# Patient Record
Sex: FEMALE | Race: WHITE | NOT HISPANIC OR LATINO | Employment: FULL TIME | URBAN - NONMETROPOLITAN AREA
[De-identification: names, ages, dates, MRNs, and addresses within clinical notes are randomized per-mention and may not be internally consistent; named-entity substitution may affect disease eponyms.]

---

## 2021-08-19 ENCOUNTER — LAB (OUTPATIENT)
Dept: LAB | Facility: HOSPITAL | Age: 31
End: 2021-08-19

## 2021-08-19 ENCOUNTER — OFFICE VISIT (OUTPATIENT)
Dept: INTERNAL MEDICINE | Facility: CLINIC | Age: 31
End: 2021-08-19

## 2021-08-19 VITALS
HEIGHT: 64 IN | WEIGHT: 129.9 LBS | BODY MASS INDEX: 22.18 KG/M2 | OXYGEN SATURATION: 99 % | RESPIRATION RATE: 16 BRPM | SYSTOLIC BLOOD PRESSURE: 112 MMHG | TEMPERATURE: 98.2 F | HEART RATE: 77 BPM | DIASTOLIC BLOOD PRESSURE: 84 MMHG

## 2021-08-19 DIAGNOSIS — E03.9 HYPOTHYROIDISM, UNSPECIFIED TYPE: Primary | ICD-10-CM

## 2021-08-19 DIAGNOSIS — Z00.00 PREVENTATIVE HEALTH CARE: ICD-10-CM

## 2021-08-19 DIAGNOSIS — R53.83 FATIGUE, UNSPECIFIED TYPE: ICD-10-CM

## 2021-08-19 DIAGNOSIS — E78.01 FAMILIAL HYPERCHOLESTEREMIA: ICD-10-CM

## 2021-08-19 DIAGNOSIS — E03.9 HYPOTHYROIDISM, UNSPECIFIED TYPE: ICD-10-CM

## 2021-08-19 LAB
ALBUMIN SERPL-MCNC: 4.3 G/DL (ref 3.5–5)
ALBUMIN/GLOB SERPL: 1.3 G/DL (ref 1.1–2.5)
ALP SERPL-CCNC: 54 U/L (ref 24–120)
ALT SERPL W P-5'-P-CCNC: 13 U/L (ref 0–35)
ANION GAP SERPL CALCULATED.3IONS-SCNC: 5 MMOL/L (ref 4–13)
AST SERPL-CCNC: 22 U/L (ref 7–45)
BILIRUB SERPL-MCNC: 0.3 MG/DL (ref 0.1–1)
BUN SERPL-MCNC: 14 MG/DL (ref 5–21)
BUN/CREAT SERPL: 15.9
CALCIUM SPEC-SCNC: 9.2 MG/DL (ref 8.4–10.4)
CHLORIDE SERPL-SCNC: 109 MMOL/L (ref 98–110)
CHOLEST SERPL-MCNC: 172 MG/DL (ref 130–200)
CO2 SERPL-SCNC: 27 MMOL/L (ref 24–31)
CREAT SERPL-MCNC: 0.88 MG/DL (ref 0.5–1.4)
ERYTHROCYTE [DISTWIDTH] IN BLOOD BY AUTOMATED COUNT: 12 % (ref 12.3–15.4)
GFR SERPL CREATININE-BSD FRML MDRD: 77 ML/MIN/1.73
GLOBULIN UR ELPH-MCNC: 3.3 GM/DL
GLUCOSE SERPL-MCNC: 80 MG/DL (ref 70–100)
HCT VFR BLD AUTO: 40.6 % (ref 34–46.6)
HDLC SERPL-MCNC: 80 MG/DL
HGB BLD-MCNC: 13.3 G/DL (ref 12–15.9)
LDLC SERPL CALC-MCNC: 64 MG/DL (ref 0–99)
LDLC/HDLC SERPL: 0.72 {RATIO}
MCH RBC QN AUTO: 28.8 PG (ref 26.6–33)
MCHC RBC AUTO-ENTMCNC: 32.8 G/DL (ref 31.5–35.7)
MCV RBC AUTO: 87.9 FL (ref 79–97)
PLATELET # BLD AUTO: 339 10*3/MM3 (ref 140–450)
PMV BLD AUTO: 8.4 FL (ref 6–12)
POTASSIUM SERPL-SCNC: 4.3 MMOL/L (ref 3.5–5.3)
PROT SERPL-MCNC: 7.6 G/DL (ref 6.3–8.7)
RBC # BLD AUTO: 4.62 10*6/MM3 (ref 3.77–5.28)
SODIUM SERPL-SCNC: 141 MMOL/L (ref 135–145)
TRIGL SERPL-MCNC: 174 MG/DL (ref 0–149)
VLDLC SERPL-MCNC: 28 MG/DL (ref 5–40)
WBC # BLD AUTO: 6.9 10*3/MM3 (ref 3.4–10.8)

## 2021-08-19 PROCEDURE — 80053 COMPREHEN METABOLIC PANEL: CPT

## 2021-08-19 PROCEDURE — 80061 LIPID PANEL: CPT

## 2021-08-19 PROCEDURE — 84443 ASSAY THYROID STIM HORMONE: CPT

## 2021-08-19 PROCEDURE — 36415 COLL VENOUS BLD VENIPUNCTURE: CPT

## 2021-08-19 PROCEDURE — 86803 HEPATITIS C AB TEST: CPT

## 2021-08-19 PROCEDURE — 85027 COMPLETE CBC AUTOMATED: CPT

## 2021-08-19 PROCEDURE — 99203 OFFICE O/P NEW LOW 30 MIN: CPT | Performed by: NURSE PRACTITIONER

## 2021-08-19 RX ORDER — BUPROPION HYDROCHLORIDE 150 MG/1
150 TABLET ORAL DAILY
COMMUNITY
Start: 2021-07-16 | End: 2022-08-10

## 2021-08-19 RX ORDER — OMEPRAZOLE 20 MG/1
20 CAPSULE, DELAYED RELEASE ORAL DAILY
COMMUNITY
End: 2022-09-14 | Stop reason: SDUPTHER

## 2021-08-19 NOTE — PROGRESS NOTES
"Chief Complaint   Patient presents with   • Establish Care     requests labs to check thyroid   • Hypothyroidism       History:  Emerita Hutchins is a 30 y.o. female who presents today for follow-up for evaluation of the above:    HPI     Patient presents today to establish care and for follow-up of hypothyroidism.  She states she is currently not on treatment for hypothyroidism.  She has been experiencing hair loss and dry skin.  She reports that she did have Covid in February.        ROS:  Review of Systems   Constitutional: Positive for fatigue.   HENT:        Hair loss   Skin:        dryness   All other systems reviewed and are negative.      Ms. Hutchins  reports that she has never smoked. She has never used smokeless tobacco. She reports current alcohol use. She reports that she does not use drugs.      Current Outpatient Medications:   •  buPROPion XL (WELLBUTRIN XL) 150 MG 24 hr tablet, Take 150 mg by mouth Daily., Disp: , Rfl:   •  Norgestim-Eth Estrad Triphasic (ORTHO TRI-CYCLEN, 28, PO), Take 1 tablet by mouth Daily., Disp: , Rfl:   •  omeprazole (priLOSEC) 20 MG capsule, Take 20 mg by mouth Daily., Disp: , Rfl:       OBJECTIVE:  /84 (BP Location: Right arm, Patient Position: Sitting, Cuff Size: Adult)   Pulse 77   Temp 98.2 °F (36.8 °C) (Temporal)   Resp 16   Ht 162.6 cm (64\")   Wt 58.9 kg (129 lb 14.4 oz)   SpO2 99%   BMI 22.30 kg/m²    Physical Exam  Vitals reviewed.   Constitutional:       Appearance: She is well-developed.   HENT:      Head: Normocephalic and atraumatic.   Eyes:      Pupils: Pupils are equal, round, and reactive to light.   Cardiovascular:      Rate and Rhythm: Normal rate and regular rhythm.      Heart sounds: Normal heart sounds.   Pulmonary:      Effort: Pulmonary effort is normal.      Breath sounds: Normal breath sounds.   Abdominal:      General: Bowel sounds are normal.      Palpations: Abdomen is soft.   Musculoskeletal:         General: Normal range of motion.      " Cervical back: Normal range of motion and neck supple.   Skin:     General: Skin is warm and dry.   Neurological:      Mental Status: She is alert and oriented to person, place, and time.         Assessment/Plan    Diagnoses and all orders for this visit:    1. Hypothyroidism, unspecified type (Primary)  -     TSH; Future  Labs to further evaluate.    2. Fatigue, unspecified type  -     CBC No Differential; Future  -     Comprehensive metabolic panel; Future    3. Familial hypercholesteremia  -     Lipid panel; Future    4. Preventative health care  -     Hepatitis C antibody; Future         An After Visit Summary was printed and given to the patient at discharge.  Return in about 1 year (around 8/19/2022) for Annual physical. Sooner if problems arise.          Shelley Banerjee APRN. 8/19/2021   Electronically Signed

## 2021-08-20 LAB
HCV AB SER DONR QL: NORMAL
TSH SERPL DL<=0.05 MIU/L-ACNC: 2.38 UIU/ML (ref 0.27–4.2)

## 2021-09-23 RX ORDER — BUSPIRONE HYDROCHLORIDE 5 MG/1
5 TABLET ORAL 3 TIMES DAILY PRN
Qty: 60 TABLET | Refills: 6 | Status: SHIPPED | OUTPATIENT
Start: 2021-09-23 | End: 2023-02-03

## 2021-10-07 ENCOUNTER — IMMUNIZATION (OUTPATIENT)
Dept: PRIMARY CARE CLINIC | Age: 31
End: 2021-10-07
Payer: COMMERCIAL

## 2021-10-07 DIAGNOSIS — Z23 NEED FOR INFLUENZA VACCINATION: Primary | ICD-10-CM

## 2021-10-07 PROCEDURE — 90674 CCIIV4 VAC NO PRSV 0.5 ML IM: CPT | Performed by: NURSE PRACTITIONER

## 2021-10-07 PROCEDURE — 90471 IMMUNIZATION ADMIN: CPT | Performed by: NURSE PRACTITIONER

## 2021-10-22 DIAGNOSIS — B00.1 HERPES LABIALIS: Primary | ICD-10-CM

## 2021-10-22 RX ORDER — VALACYCLOVIR HYDROCHLORIDE 1 G/1
2000 TABLET, FILM COATED ORAL 2 TIMES DAILY
Qty: 4 TABLET | Refills: 0 | Status: SHIPPED | OUTPATIENT
Start: 2021-10-22 | End: 2021-10-23

## 2021-10-25 DIAGNOSIS — R05.9 COUGH: Primary | ICD-10-CM

## 2021-10-25 DIAGNOSIS — R51.9 NONINTRACTABLE HEADACHE, UNSPECIFIED CHRONICITY PATTERN, UNSPECIFIED HEADACHE TYPE: ICD-10-CM

## 2021-10-25 LAB
ADENOVIRUS BY PCR: NOT DETECTED
BORDETELLA PARAPERTUSSIS BY PCR: NOT DETECTED
BORDETELLA PERTUSSIS BY PCR: NOT DETECTED
CHLAMYDOPHILIA PNEUMONIAE BY PCR: NOT DETECTED
CORONAVIRUS 229E BY PCR: NOT DETECTED
CORONAVIRUS HKU1 BY PCR: NOT DETECTED
CORONAVIRUS NL63 BY PCR: NOT DETECTED
CORONAVIRUS OC43 BY PCR: NOT DETECTED
HUMAN METAPNEUMOVIRUS BY PCR: NOT DETECTED
HUMAN RHINOVIRUS/ENTEROVIRUS BY PCR: NOT DETECTED
INFLUENZA A BY PCR: NOT DETECTED
INFLUENZA B BY PCR: NOT DETECTED
MYCOPLASMA PNEUMONIAE BY PCR: NOT DETECTED
PARAINFLUENZA VIRUS 1 BY PCR: NOT DETECTED
PARAINFLUENZA VIRUS 2 BY PCR: NOT DETECTED
PARAINFLUENZA VIRUS 3 BY PCR: NOT DETECTED
PARAINFLUENZA VIRUS 4 BY PCR: NOT DETECTED
RESPIRATORY SYNCYTIAL VIRUS BY PCR: NOT DETECTED
SARS-COV-2, PCR: NOT DETECTED

## 2022-04-22 DIAGNOSIS — J01.10 ACUTE NON-RECURRENT FRONTAL SINUSITIS: Primary | ICD-10-CM

## 2022-04-22 RX ORDER — AMOXICILLIN AND CLAVULANATE POTASSIUM 875; 125 MG/1; MG/1
1 TABLET, FILM COATED ORAL 2 TIMES DAILY
Qty: 20 TABLET | Refills: 0 | Status: SHIPPED | OUTPATIENT
Start: 2022-04-22 | End: 2022-05-02

## 2022-04-22 RX ORDER — METHYLPREDNISOLONE 4 MG/1
TABLET ORAL
Qty: 21 EACH | Refills: 0 | Status: SHIPPED | OUTPATIENT
Start: 2022-04-22 | End: 2022-08-19

## 2022-08-19 ENCOUNTER — OFFICE VISIT (OUTPATIENT)
Dept: INTERNAL MEDICINE | Facility: CLINIC | Age: 32
End: 2022-08-19

## 2022-08-19 ENCOUNTER — LAB (OUTPATIENT)
Dept: LAB | Facility: HOSPITAL | Age: 32
End: 2022-08-19

## 2022-08-19 VITALS
OXYGEN SATURATION: 96 % | BODY MASS INDEX: 21.56 KG/M2 | TEMPERATURE: 98 F | SYSTOLIC BLOOD PRESSURE: 112 MMHG | RESPIRATION RATE: 16 BRPM | HEIGHT: 64 IN | WEIGHT: 126.3 LBS | DIASTOLIC BLOOD PRESSURE: 82 MMHG | HEART RATE: 72 BPM

## 2022-08-19 DIAGNOSIS — F32.A DEPRESSIVE DISORDER: ICD-10-CM

## 2022-08-19 DIAGNOSIS — Z80.0 FAMILY HISTORY OF ESOPHAGEAL CANCER: ICD-10-CM

## 2022-08-19 DIAGNOSIS — Z83.79 FAMILY HISTORY OF BARRETT'S ESOPHAGUS: ICD-10-CM

## 2022-08-19 DIAGNOSIS — Z86.39 HISTORY OF UNDERACTIVE THYROID: ICD-10-CM

## 2022-08-19 DIAGNOSIS — Z00.01 ANNUAL VISIT FOR GENERAL ADULT MEDICAL EXAMINATION WITH ABNORMAL FINDINGS: Primary | ICD-10-CM

## 2022-08-19 DIAGNOSIS — Z00.01 ANNUAL VISIT FOR GENERAL ADULT MEDICAL EXAMINATION WITH ABNORMAL FINDINGS: ICD-10-CM

## 2022-08-19 DIAGNOSIS — L65.9 HAIR LOSS: ICD-10-CM

## 2022-08-19 DIAGNOSIS — K21.9 GASTROESOPHAGEAL REFLUX DISEASE, UNSPECIFIED WHETHER ESOPHAGITIS PRESENT: ICD-10-CM

## 2022-08-19 PROBLEM — M62.838 MUSCLE SPASMS OF HEAD OR NECK: Status: ACTIVE | Noted: 2018-03-02

## 2022-08-19 PROBLEM — R07.0 PAIN IN THROAT: Status: RESOLVED | Noted: 2017-07-19 | Resolved: 2022-08-19

## 2022-08-19 PROBLEM — J03.90 ACUTE TONSILLITIS: Status: ACTIVE | Noted: 2017-07-19

## 2022-08-19 PROBLEM — E03.9 ACQUIRED HYPOTHYROIDISM: Status: RESOLVED | Noted: 2017-07-19 | Resolved: 2022-08-19

## 2022-08-19 PROBLEM — R07.0 PAIN IN THROAT: Status: ACTIVE | Noted: 2017-07-19

## 2022-08-19 PROBLEM — M54.2 NECK PAIN: Status: ACTIVE | Noted: 2018-03-02

## 2022-08-19 PROBLEM — E03.9 ACQUIRED HYPOTHYROIDISM: Status: ACTIVE | Noted: 2017-07-19

## 2022-08-19 PROBLEM — J03.90 ACUTE TONSILLITIS: Status: RESOLVED | Noted: 2017-07-19 | Resolved: 2022-08-19

## 2022-08-19 PROBLEM — M54.2 NECK PAIN: Status: RESOLVED | Noted: 2018-03-02 | Resolved: 2022-08-19

## 2022-08-19 LAB
ALBUMIN SERPL-MCNC: 4.9 G/DL (ref 3.5–5.2)
ALBUMIN/GLOB SERPL: 1.8 G/DL
ALP SERPL-CCNC: 57 U/L (ref 39–117)
ALT SERPL W P-5'-P-CCNC: 18 U/L (ref 1–33)
ANION GAP SERPL CALCULATED.3IONS-SCNC: 11 MMOL/L (ref 5–15)
AST SERPL-CCNC: 18 U/L (ref 1–32)
BILIRUB SERPL-MCNC: 0.5 MG/DL (ref 0–1.2)
BUN SERPL-MCNC: 18 MG/DL (ref 6–20)
BUN/CREAT SERPL: 19.6 (ref 7–25)
CALCIUM SPEC-SCNC: 9.3 MG/DL (ref 8.6–10.5)
CHLORIDE SERPL-SCNC: 105 MMOL/L (ref 98–107)
CHOLEST SERPL-MCNC: 171 MG/DL (ref 0–200)
CO2 SERPL-SCNC: 22 MMOL/L (ref 22–29)
CREAT SERPL-MCNC: 0.92 MG/DL (ref 0.57–1)
EGFRCR SERPLBLD CKD-EPI 2021: 86.6 ML/MIN/1.73
GLOBULIN UR ELPH-MCNC: 2.8 GM/DL
GLUCOSE SERPL-MCNC: 87 MG/DL (ref 65–99)
HBA1C MFR BLD: 4.9 % (ref 4.8–5.6)
HDLC SERPL-MCNC: 76 MG/DL (ref 40–60)
LDLC SERPL CALC-MCNC: 83 MG/DL (ref 0–100)
LDLC/HDLC SERPL: 1.09 {RATIO}
POTASSIUM SERPL-SCNC: 4 MMOL/L (ref 3.5–5.2)
PROT SERPL-MCNC: 7.7 G/DL (ref 6–8.5)
SODIUM SERPL-SCNC: 138 MMOL/L (ref 136–145)
T4 FREE SERPL-MCNC: 1.03 NG/DL (ref 0.93–1.7)
TRIGL SERPL-MCNC: 62 MG/DL (ref 0–150)
TSH SERPL DL<=0.05 MIU/L-ACNC: 2.04 UIU/ML (ref 0.27–4.2)
VLDLC SERPL-MCNC: 12 MG/DL (ref 5–40)

## 2022-08-19 PROCEDURE — 83036 HEMOGLOBIN GLYCOSYLATED A1C: CPT

## 2022-08-19 PROCEDURE — 99214 OFFICE O/P EST MOD 30 MIN: CPT | Performed by: INTERNAL MEDICINE

## 2022-08-19 PROCEDURE — 36415 COLL VENOUS BLD VENIPUNCTURE: CPT

## 2022-08-19 PROCEDURE — 84439 ASSAY OF FREE THYROXINE: CPT

## 2022-08-19 PROCEDURE — 84443 ASSAY THYROID STIM HORMONE: CPT

## 2022-08-19 PROCEDURE — 80061 LIPID PANEL: CPT

## 2022-08-19 PROCEDURE — 84481 FREE ASSAY (FT-3): CPT

## 2022-08-19 PROCEDURE — 99395 PREV VISIT EST AGE 18-39: CPT | Performed by: INTERNAL MEDICINE

## 2022-08-19 PROCEDURE — 80053 COMPREHEN METABOLIC PANEL: CPT

## 2022-08-19 RX ORDER — PRENATAL VIT/IRON FUM/FOLIC AC 27MG-0.8MG
1 TABLET ORAL DAILY
COMMUNITY

## 2022-08-19 NOTE — PROGRESS NOTES
CC: f/u preventive health AND hair loss    History:  Emerita Hutchins is a 31 y.o. female who presents today for evaluation of the above problems.  She notes she has been doing well without any acute illness, but has noticed increase in hair loss recently, but wonders if this could be related to previous issues with thyroid and/or stopping OCP to pursue pregnancy efforst. She has had ongoing issues with acid reflux and has progressed from Tums to Pepcid and has now been on PPI for the past 2 years. She wonders if anything should be done about this.       ROS:  Review of Systems   Constitutional: Negative for chills and fever.   HENT: Negative for congestion and sore throat.    Eyes: Negative for visual disturbance.   Respiratory: Negative for cough and shortness of breath.    Cardiovascular: Negative for chest pain and palpitations.   Gastrointestinal: Negative for abdominal pain, constipation and nausea.   Endocrine: Negative for cold intolerance and heat intolerance.   Genitourinary: Negative for difficulty urinating and frequency.   Musculoskeletal: Negative for arthralgias and back pain.   Skin: Negative for rash.   Neurological: Negative for dizziness and headaches.   Psychiatric/Behavioral: Negative for dysphoric mood. The patient is not nervous/anxious.        No Known Allergies  Past Medical History:   Diagnosis Date   • Anxiety    • Depression    • Hypothyroidism      History reviewed. No pertinent surgical history.  Family History   Problem Relation Age of Onset   • Cancer Mother    • Diabetes Mother    • Hypertension Mother    • Hyperlipidemia Mother    • Drug abuse Brother    • Dementia Maternal Grandmother    • Cancer Maternal Grandfather    • Esophageal cancer Maternal Grandfather       reports that she has never smoked. She has never used smokeless tobacco. She reports current alcohol use. She reports that she does not use drugs.      Current Outpatient Medications:   •  buPROPion SR (WELLBUTRIN SR)  "100 MG 12 hr tablet, Take 1 tablet by mouth Daily., Disp: 30 tablet, Rfl: 0  •  busPIRone (BUSPAR) 5 MG tablet, Take 1 tablet by mouth 3 (Three) Times a Day As Needed (anxiety)., Disp: 60 tablet, Rfl: 6  •  Norgestim-Eth Estrad Triphasic (ORTHO TRI-CYCLEN, 28, PO), Take 1 tablet by mouth Daily., Disp: , Rfl:   •  omeprazole (priLOSEC) 20 MG capsule, Take 20 mg by mouth Daily., Disp: , Rfl:     OBJECTIVE:  /82 (BP Location: Left arm, Patient Position: Sitting, Cuff Size: Adult)   Pulse 72   Temp 98 °F (36.7 °C)   Resp 16   Ht 162.6 cm (64\")   Wt 57.3 kg (126 lb 4.8 oz)   SpO2 96%   Breastfeeding No   BMI 21.68 kg/m²    Physical Exam  Constitutional:       General: She is not in acute distress.     Appearance: She is well-developed.   HENT:      Head: Normocephalic and atraumatic.      Right Ear: External ear normal.      Left Ear: External ear normal.   Eyes:      General: No scleral icterus.     Extraocular Movements: Extraocular movements intact.   Neck:      Trachea: No tracheal deviation.   Cardiovascular:      Rate and Rhythm: Normal rate and regular rhythm.      Heart sounds: Normal heart sounds. No murmur heard.  Pulmonary:      Effort: Pulmonary effort is normal. No accessory muscle usage or respiratory distress.      Breath sounds: Normal breath sounds. No wheezing.   Abdominal:      General: There is no distension.      Palpations: Abdomen is soft.      Tenderness: There is no abdominal tenderness.   Musculoskeletal:         General: Normal range of motion.      Cervical back: Normal range of motion and neck supple.      Right lower leg: No edema.      Left lower leg: No edema.   Skin:     General: Skin is warm and dry.      Nails: There is no clubbing.   Neurological:      Mental Status: She is alert and oriented to person, place, and time.      Coordination: Coordination normal.      Gait: Gait normal.   Psychiatric:         Mood and Affect: Mood normal. Mood is not anxious or depressed.    "      Behavior: Behavior normal.         Assessment/Plan     Diagnoses and all orders for this visit:    1. Annual visit for general adult medical examination with abnormal findings (Primary)  -     Comprehensive Metabolic Panel; Future  -     Hemoglobin A1c; Future  -     Lipid Panel; Future    2. History of underactive thyroid  3. Hair loss  -     TSH; Future  -     T3, Free; Future  -     T4, Free; Future  Check Thyroid functions given previous abnormalities, but hair loss could also be related to d/c of OCP recently.     4. Depressive disorder  She is currently tapering off wellbutrin and feels she has been doing alright with this.     5. Gastroesophageal reflux disease, unspecified whether esophagitis present  6. Family history of Olvera's esophagus  7. Family history of esophageal cancer  -     Ambulatory Referral to Gastroenterology  Given family history and her own ongoing symptoms despite PPI, we'll refer to GI for evaluation and consideration of endoscopy.        An After Visit Summary was printed and given to the patient at discharge.  Return in about 1 year (around 8/19/2023) for Annual physical.         Chacorta Dudley D.O. 8/21/2022   Electronically signed.

## 2022-08-20 LAB — T3FREE SERPL-MCNC: 2.74 PG/ML (ref 2–4.4)

## 2022-09-12 RX ORDER — BUPROPION HYDROCHLORIDE 100 MG/1
100 TABLET, EXTENDED RELEASE ORAL DAILY
Qty: 30 TABLET | Refills: 2 | Status: SHIPPED | OUTPATIENT
Start: 2022-09-12 | End: 2022-10-28 | Stop reason: SDUPTHER

## 2022-09-14 ENCOUNTER — OFFICE VISIT (OUTPATIENT)
Dept: GASTROENTEROLOGY | Facility: CLINIC | Age: 32
End: 2022-09-14

## 2022-09-14 VITALS
SYSTOLIC BLOOD PRESSURE: 108 MMHG | BODY MASS INDEX: 21.34 KG/M2 | TEMPERATURE: 97.5 F | WEIGHT: 125 LBS | DIASTOLIC BLOOD PRESSURE: 70 MMHG | HEIGHT: 64 IN | HEART RATE: 66 BPM | OXYGEN SATURATION: 100 %

## 2022-09-14 DIAGNOSIS — K21.9 GASTROESOPHAGEAL REFLUX DISEASE, UNSPECIFIED WHETHER ESOPHAGITIS PRESENT: Primary | ICD-10-CM

## 2022-09-14 DIAGNOSIS — Z80.0 FH: ESOPHAGEAL CANCER: ICD-10-CM

## 2022-09-14 PROCEDURE — 99214 OFFICE O/P EST MOD 30 MIN: CPT | Performed by: NURSE PRACTITIONER

## 2022-09-14 RX ORDER — OMEPRAZOLE 40 MG/1
40 CAPSULE, DELAYED RELEASE ORAL DAILY
Qty: 30 CAPSULE | Refills: 5 | Status: SHIPPED | OUTPATIENT
Start: 2022-09-14 | End: 2022-10-14

## 2022-09-14 NOTE — PROGRESS NOTES
"Primary Physician: Chacorta Dudley DO    Chief Complaint   Patient presents with   • Heartburn     Pt c/o reflux \"my whole life\"-states about 2 years ago she started taking Omeprazole daily and that does help but she has issues if she doesn't take it; Family history of Olvera's and esophageal cancer       Subjective     Emerita Hutchins is a 31 y.o. female.    HPI   GERD- had chronic acid reflux since she was very young.    Takes Prilosec and usually does well but if she misses a dose she has terrible nausea and burning.  Even with daily Omeprazole she will have nocturnal burning and reflux.  No dysphagia. Mother and maternal grandmother has Olvera's Esophagus and her paternal grandfather had colon cancer.      Past Medical History:   Diagnosis Date   • Anxiety    • Depression    • Family history of colonic polyps    • Hypothyroidism        History reviewed. No pertinent surgical history.     Current Outpatient Medications:   •  buPROPion SR (WELLBUTRIN SR) 100 MG 12 hr tablet, Take 1 tablet by mouth Daily., Disp: 30 tablet, Rfl: 2  •  busPIRone (BUSPAR) 5 MG tablet, Take 1 tablet by mouth 3 (Three) Times a Day As Needed (anxiety)., Disp: 60 tablet, Rfl: 6  •  prenatal vitamin tablet, Take 1 tablet by mouth Daily., Disp: , Rfl:   •  omeprazole (priLOSEC) 40 MG capsule, Take 1 capsule by mouth Daily for 30 days., Disp: 30 capsule, Rfl: 5    No Known Allergies    Social History     Socioeconomic History   • Marital status:    Tobacco Use   • Smoking status: Never Smoker   • Smokeless tobacco: Never Used   Vaping Use   • Vaping Use: Never used   Substance and Sexual Activity   • Alcohol use: Yes     Comment: Occasionally   • Drug use: Never   • Sexual activity: Defer       Family History   Problem Relation Age of Onset   • Diabetes Mother    • Hypertension Mother    • Hyperlipidemia Mother    • Olvera's esophagus Mother    • Breast cancer Mother    • Drug abuse Brother    • Olvera's esophagus Maternal " "Grandmother    • Dementia Maternal Grandmother    • Colon polyps Maternal Grandfather         < 60 years of age   • Cancer Maternal Grandfather    • Esophageal cancer Maternal Grandfather    • Colon cancer Neg Hx    • Liver cancer Neg Hx    • Liver disease Neg Hx    • Rectal cancer Neg Hx    • Stomach cancer Neg Hx        Review of Systems    Objective     /70 (BP Location: Left arm, Patient Position: Sitting, Cuff Size: Adult)   Pulse 66   Temp 97.5 °F (36.4 °C) (Infrared)   Ht 162.6 cm (64\")   Wt 56.7 kg (125 lb)   SpO2 100%   Breastfeeding No   BMI 21.46 kg/m²     Physical Exam    Lab Results - Last 18 Months   Lab Units 22  1416 21  1251   GLUCOSE mg/dL 87 80   BUN mg/dL 18 14   CREATININE mg/dL 0.92 0.88   SODIUM mmol/L 138 141   POTASSIUM mmol/L 4.0 4.3   CHLORIDE mmol/L 105 109   CO2 mmol/L 22.0 27.0   TOTAL PROTEIN g/dL 7.7 7.6   ALBUMIN g/dL 4.90 4.30   ALT (SGPT) U/L 18 13   AST (SGOT) U/L 18 22   ALK PHOS U/L 57 54   BILIRUBIN mg/dL 0.5 0.3   GLOBULIN gm/dL 2.8 3.3       Lab Results - Last 18 Months   Lab Units 21  1251   HEMOGLOBIN g/dL 13.3   HEMATOCRIT % 40.6   MCV fL 87.9   WBC 10*3/mm3 6.90   RDW % 12.0*   MPV fL 8.4   PLATELETS 10*3/mm3 339       Lab Results - Last 18 Months   Lab Units 22  1416 21  1251   TSH uIU/mL 2.040 2.380            IMPRESSION/PLAN:    Assessment & Plan      Problem List Items Addressed This Visit        Family History    FH: esophageal cancer    Overview     Mother and maternal grandmother had Olvera's Esophagus.  Maternal Grandfather had esophageal cancer and  at age 52 from it.            Gastrointestinal Abdominal     GERD (gastroesophageal reflux disease) - Primary    Relevant Medications    omeprazole (priLOSEC) 40 MG capsule          Omeprazole script filled  GERD Education and Anti-Reflux precautions reviewed.  Endo evaluation per Dr Gray          The risks, benefits, and alternatives of colonoscopy were reviewed with " the patient today.  Risks including perforation of the colon possibly requiring surgery or colostomy.  Additional risks include risk of bleeding from biopsies or removal of colon tissue.  There is also the risk of a drug reaction or problems with anesthesia.  This will be discussed with the further by the anesthesia team on the day of the procedure.  Lastly there is a possibility of missing a colon polyp or cancer.  The benefits include the diagnosis and management of disease of the colon and rectum.  Alternatives to colonoscopy include barium enema, laboratory testing, radiographic evaluation, or no intervention.  The patient verbalizes understanding and agrees.    In accordance with requirements under the Affordable Care Act, [facility] has provided pricing for all hospital services and items on each of its websites. However, a patient's actual cost may differ based on the services the patient receives to meet individual healthcare needs and based on the benefits provided under the patient’s insurance coverage.        Izabela Mcgee, APRN  09/14/22  14:40 CDT    Much of this encounter note is an electronic transcription/translation of spoken language to printed text. The electronic translation of spoken language may permit erroneous, or at times, nonsensical words or phrases to be inadvertently transcribed; although I have reviewed the note for such errors, some may still exist.

## 2022-10-28 DIAGNOSIS — F32.A DEPRESSIVE DISORDER: Primary | ICD-10-CM

## 2022-10-28 RX ORDER — BUPROPION HYDROCHLORIDE 150 MG/1
150 TABLET ORAL DAILY
Qty: 30 TABLET | Refills: 5 | Status: SHIPPED | OUTPATIENT
Start: 2022-10-28 | End: 2022-11-27

## 2023-01-03 DIAGNOSIS — K21.9 GASTROESOPHAGEAL REFLUX DISEASE, UNSPECIFIED WHETHER ESOPHAGITIS PRESENT: Primary | ICD-10-CM

## 2023-01-03 RX ORDER — OMEPRAZOLE 20 MG/1
20 CAPSULE, DELAYED RELEASE ORAL DAILY
Qty: 30 CAPSULE | Refills: 11 | Status: SHIPPED | OUTPATIENT
Start: 2023-01-03 | End: 2023-05-04

## 2023-01-18 ENCOUNTER — HOSPITAL ENCOUNTER (OUTPATIENT)
Facility: HOSPITAL | Age: 33
Setting detail: HOSPITAL OUTPATIENT SURGERY
Discharge: HOME | End: 2023-01-18
Attending: INTERNAL MEDICINE | Admitting: INTERNAL MEDICINE
Payer: COMMERCIAL

## 2023-01-18 ENCOUNTER — ANESTHESIA (OUTPATIENT)
Dept: GASTROENTEROLOGY | Facility: HOSPITAL | Age: 33
End: 2023-01-18
Payer: COMMERCIAL

## 2023-01-18 ENCOUNTER — ANESTHESIA EVENT (OUTPATIENT)
Dept: GASTROENTEROLOGY | Facility: HOSPITAL | Age: 33
End: 2023-01-18
Payer: COMMERCIAL

## 2023-01-18 VITALS
TEMPERATURE: 98.6 F | SYSTOLIC BLOOD PRESSURE: 117 MMHG | RESPIRATION RATE: 18 BRPM | BODY MASS INDEX: 21.68 KG/M2 | DIASTOLIC BLOOD PRESSURE: 85 MMHG | HEIGHT: 64 IN | WEIGHT: 127 LBS | OXYGEN SATURATION: 99 % | HEART RATE: 72 BPM

## 2023-01-18 DIAGNOSIS — K21.9 GASTROESOPHAGEAL REFLUX DISEASE, UNSPECIFIED WHETHER ESOPHAGITIS PRESENT: ICD-10-CM

## 2023-01-18 DIAGNOSIS — Z80.0 FH: ESOPHAGEAL CANCER: ICD-10-CM

## 2023-01-18 LAB — B-HCG UR QL: NEGATIVE

## 2023-01-18 PROCEDURE — 25010000002 PROPOFOL 10 MG/ML EMULSION: Performed by: NURSE ANESTHETIST, CERTIFIED REGISTERED

## 2023-01-18 PROCEDURE — 81025 URINE PREGNANCY TEST: CPT | Performed by: NURSE ANESTHETIST, CERTIFIED REGISTERED

## 2023-01-18 PROCEDURE — 43235 EGD DIAGNOSTIC BRUSH WASH: CPT | Performed by: INTERNAL MEDICINE

## 2023-01-18 RX ORDER — PROPOFOL 10 MG/ML
VIAL (ML) INTRAVENOUS AS NEEDED
Status: DISCONTINUED | OUTPATIENT
Start: 2023-01-18 | End: 2023-01-18 | Stop reason: SURG

## 2023-01-18 RX ORDER — LIDOCAINE HYDROCHLORIDE 20 MG/ML
INJECTION, SOLUTION EPIDURAL; INFILTRATION; INTRACAUDAL; PERINEURAL AS NEEDED
Status: DISCONTINUED | OUTPATIENT
Start: 2023-01-18 | End: 2023-01-18 | Stop reason: SURG

## 2023-01-18 RX ORDER — SODIUM CHLORIDE 9 MG/ML
1000 INJECTION, SOLUTION INTRAVENOUS CONTINUOUS
Status: DISCONTINUED | OUTPATIENT
Start: 2023-01-18 | End: 2023-01-18

## 2023-01-18 RX ORDER — SODIUM CHLORIDE 0.9 % (FLUSH) 0.9 %
10 SYRINGE (ML) INJECTION AS NEEDED
Status: DISCONTINUED | OUTPATIENT
Start: 2023-01-18 | End: 2023-01-18 | Stop reason: HOSPADM

## 2023-01-18 RX ORDER — SODIUM CHLORIDE 9 MG/ML
500 INJECTION, SOLUTION INTRAVENOUS CONTINUOUS PRN
Status: DISCONTINUED | OUTPATIENT
Start: 2023-01-18 | End: 2023-01-18 | Stop reason: HOSPADM

## 2023-01-18 RX ADMIN — SODIUM CHLORIDE 500 ML: 9 INJECTION, SOLUTION INTRAVENOUS at 07:25

## 2023-01-18 RX ADMIN — LIDOCAINE HYDROCHLORIDE 100 MG: 20 INJECTION, SOLUTION EPIDURAL; INFILTRATION; INTRACAUDAL; PERINEURAL at 07:40

## 2023-01-18 RX ADMIN — PROPOFOL INJECTABLE EMULSION 200 MG: 10 INJECTION, EMULSION INTRAVENOUS at 07:40

## 2023-01-18 RX ADMIN — GLYCOPYRROLATE 0.1 MG: 0.2 INJECTION INTRAMUSCULAR; INTRAVENOUS at 07:38

## 2023-01-18 NOTE — ANESTHESIA POSTPROCEDURE EVALUATION
Patient: Emerita Hutchins    Procedure Summary     Date: 01/18/23 Room / Location: [facility] ENDOSCOPY 6 / [facility]    Anesthesia Start: 0737 Anesthesia Stop: 0748    Procedure: ESOPHAGOGASTRODUODENOSCOPY WITH ANESTHESIA Diagnosis:       Gastroesophageal reflux disease, unspecified whether esophagitis present      FH: esophageal cancer      (Gastroesophageal reflux disease, unspecified whether esophagitis present [K21.9])      (FH: esophageal cancer [Z80.0])    Surgeons: Desirae Gray MD Provider: Maria E Rosado CRNA    Anesthesia Type: MAC ASA Status: 1          Anesthesia Type: MAC    Vitals  Vitals Value Taken Time   /85 01/18/23 0806   Temp     Pulse 72 01/18/23 0807   Resp 18 01/18/23 0805   SpO2 99 % 01/18/23 0807   Vitals shown include unvalidated device data.        Post Anesthesia Care and Evaluation    Patient location during evaluation: PHASE II  Patient participation: complete - patient participated  Level of consciousness: awake and alert  Pain management: adequate    Airway patency: patent  Anesthetic complications: No anesthetic complications  PONV Status: none  Cardiovascular status: acceptable  Respiratory status: acceptable  Hydration status: acceptable  No anesthesia care post op

## 2023-01-18 NOTE — DISCHARGE INSTRUCTIONS
Follow Dr. Gray's discharge instructions.    Cont. Omeprazole 20 mg daily on empty stomach and wast 30-45 min before eating or drinking.

## 2023-01-18 NOTE — H&P
Chief Complaint:   GERD    Subjective     HPI:   She has had longstanding heartburn and is on omeprazole daily.  She is concerned because family history is significant for esophageal cancer and Olvera's esophagus in multiple relatives.  No prior endoscopy.    Past Medical History:   Past Medical History:   Diagnosis Date   • Anxiety    • Depression    • Family history of colonic polyps    • Hypothyroidism        Past Surgical History:  History reviewed. No pertinent surgical history.    Family History:  Family History   Problem Relation Age of Onset   • Diabetes Mother    • Hypertension Mother    • Hyperlipidemia Mother    • Olvera's esophagus Mother    • Breast cancer Mother    • Drug abuse Brother    • Olvera's esophagus Maternal Grandmother    • Dementia Maternal Grandmother    • Colon polyps Maternal Grandfather         < 60 years of age   • Cancer Maternal Grandfather    • Esophageal cancer Maternal Grandfather    • Colon cancer Neg Hx    • Liver cancer Neg Hx    • Liver disease Neg Hx    • Rectal cancer Neg Hx    • Stomach cancer Neg Hx        Social History:   reports that she has never smoked. She has never used smokeless tobacco. She reports current alcohol use. She reports that she does not use drugs.    Medications:   Medications Prior to Admission   Medication Sig Dispense Refill Last Dose   • buPROPion XL (Wellbutrin XL) 150 MG 24 hr tablet Take 1 tablet by mouth Daily. 30 tablet 4 1/17/2023 at 2100   • omeprazole (priLOSEC) 20 MG capsule Take 1 capsule by mouth Daily for 30 days. 30 capsule 11 1/17/2023 at 2100   • prenatal vitamin tablet Take 1 tablet by mouth Daily.   1/17/2023 at 2100   • buPROPion XL (Wellbutrin XL) 150 MG 24 hr tablet Take 1 tablet by mouth Daily for 30 days. 30 tablet 5    • busPIRone (BUSPAR) 5 MG tablet Take 1 tablet by mouth 3 (Three) Times a Day As Needed (anxiety). 60 tablet 6        Allergies:  Patient has no known allergies.    ROS:    Resp: No SOA  Cardiovascular:  "No CP      Objective     /61 (BP Location: Right arm, Patient Position: Sitting)   Pulse 66   Temp 98.6 °F (37 °C) (Temporal)   Resp 18   Ht 162.6 cm (64\")   Wt 57.6 kg (127 lb)   LMP 01/09/2023   SpO2 100%   BMI 21.80 kg/m²     Physical Exam   Constitutional: Pt is oriented to person, place, and in no distress.   Pulmonary/Chest: No distress.  No audible wheezes  Psychiatric: Mood, memory, affect and judgment appear normal.     Assessment & Plan     Diagnosis:  GERD  Family history of esophageal cancer  Family history of Olvera's esophagus    Anticipated Surgical Procedure:  Endoscopy    The risks, benefits, and alternatives of endoscopy were reviewed with the patient today.  Risks including perforation, with or without dilation, possibly requiring surgery.  Additional risks include risk of bleeding.  There is also the risk of a drug reaction or problems with anesthesia.  This will be discussed with the further by the anesthesia team on the day of the procedure. The benefits include the diagnosis and management of disease of the upper digestive tract.  Alternatives to endoscopy include upper GI series, laboratory testing, radiographic evaluation, or no intervention.  The patient verbalizes understanding and agrees.    Much of this encounter note is an electronic transcription/translation of spoken language to printed text. The electronic translation of spoken language may permit erroneous, or at times, nonsensical words or phrases to be inadvertently transcribed; although I have reviewed the note for such errors, some may still exist.  "

## 2023-01-18 NOTE — ANESTHESIA PREPROCEDURE EVALUATION
Anesthesia Evaluation     Patient summary reviewed   no history of anesthetic complications:  NPO Solid Status: > 8 hours  NPO Liquid Status: > 8 hours           Airway   Mallampati: I  No difficulty expected  Dental - normal exam     Pulmonary - negative pulmonary ROS   Cardiovascular - negative cardio ROS  Exercise tolerance: excellent (>7 METS)        Neuro/Psych- negative ROS  GI/Hepatic/Renal/Endo    (+)  GERD,      Musculoskeletal     Abdominal    Substance History      OB/GYN          Other                        Anesthesia Plan    ASA 1     MAC       Anesthetic plan, risks, benefits, and alternatives have been provided, discussed and informed consent has been obtained with: patient.        CODE STATUS:

## 2023-01-25 ENCOUNTER — TELEPHONE (OUTPATIENT)
Dept: OBSTETRICS AND GYNECOLOGY | Facility: CLINIC | Age: 33
End: 2023-01-25

## 2023-01-25 NOTE — TELEPHONE ENCOUNTER
Caller: Emerita Hutchins    Relationship to patient: Self    Best call back number: [phone]    Patient is needing:     SAME DAY R/S     PT R/S FROM 1/25/23 @ 11:15 TO  2/1/23 @9:00    DUE TO WORK

## 2023-02-02 ENCOUNTER — TELEPHONE (OUTPATIENT)
Dept: INTERNAL MEDICINE | Facility: CLINIC | Age: 33
End: 2023-02-02

## 2023-02-02 NOTE — TELEPHONE ENCOUNTER
Caller: Emerita Hutchins    Relationship: Self    Best call back number: [phone]    What is the best time to reach you: ANY    Who are you requesting to speak with (clinical staff, provider,  specific staff member): CLINICAL    What was the call regarding:     PATIENT STATES SHE IS HAVING A LOT OF PAIN AROUND HER PERIOD. PATIENT STATES SHE IS EXPERIENCING A LOT OF PRESSURE AND HAVING DIFFICULTY STANDING UP.     PATIENT HAS AN APPOINTMENT WITH AN OBGYN ON 02.16.23. PATIENT WOULD LIKE TO SEE IF AN ULTRASOUND COULD BE ORDERED BEFORE HER APPOINTMENT?    Do you require a callback: YES

## 2023-02-03 ENCOUNTER — HOSPITAL ENCOUNTER (OUTPATIENT)
Dept: ULTRASOUND IMAGING | Facility: HOSPITAL | Age: 33
Discharge: HOME | End: 2023-02-03
Admitting: NURSE PRACTITIONER
Payer: COMMERCIAL

## 2023-02-03 ENCOUNTER — OFFICE VISIT (OUTPATIENT)
Dept: INTERNAL MEDICINE | Facility: CLINIC | Age: 33
End: 2023-02-03
Payer: COMMERCIAL

## 2023-02-03 VITALS
HEIGHT: 64 IN | RESPIRATION RATE: 16 BRPM | BODY MASS INDEX: 21.51 KG/M2 | HEART RATE: 86 BPM | OXYGEN SATURATION: 100 % | DIASTOLIC BLOOD PRESSURE: 82 MMHG | WEIGHT: 126 LBS | SYSTOLIC BLOOD PRESSURE: 120 MMHG | TEMPERATURE: 97.8 F

## 2023-02-03 DIAGNOSIS — N94.6 DYSMENORRHEA: ICD-10-CM

## 2023-02-03 DIAGNOSIS — R10.2 PELVIC PAIN: ICD-10-CM

## 2023-02-03 DIAGNOSIS — N94.6 DYSMENORRHEA: Primary | ICD-10-CM

## 2023-02-03 PROCEDURE — 76830 TRANSVAGINAL US NON-OB: CPT

## 2023-02-03 PROCEDURE — 99213 OFFICE O/P EST LOW 20 MIN: CPT | Performed by: NURSE PRACTITIONER

## 2023-02-03 NOTE — PROGRESS NOTES
"Chief Complaint   Patient presents with   • Menstrual Problem     Has been off birth control since June        HPI     Emerita Hutchins is a 32 y.o. female presents for the above problem. She has had worsening pelvic and abdominal pain with periods over the last few months. She stopped birth control in June as she is hoping to conceive. Pain has become more and more severe with each cycle. Periods are otherwise regular and bleeding has not increased.           ROS:  Review of Systems   Constitutional: Negative for fever.   Respiratory: Negative.    Gastrointestinal: Negative.    Genitourinary: Positive for menstrual problem and pelvic pain. Negative for decreased urine volume, difficulty urinating, dyspareunia, dysuria, flank pain, frequency, genital sores, hematuria, urgency, vaginal bleeding, vaginal discharge and vaginal pain.          reports that she has never smoked. She has never used smokeless tobacco. She reports current alcohol use. She reports that she does not use drugs.    Current Outpatient Medications:   •  buPROPion XL (Wellbutrin XL) 150 MG 24 hr tablet, Take 1 tablet by mouth Daily., Disp: 30 tablet, Rfl: 4  •  omeprazole (priLOSEC) 20 MG capsule, Take 1 capsule by mouth Daily for 30 days., Disp: 30 capsule, Rfl: 11  •  prenatal vitamin tablet, Take 1 tablet by mouth Daily., Disp: , Rfl:   •  buPROPion XL (Wellbutrin XL) 150 MG 24 hr tablet, Take 1 tablet by mouth Daily for 30 days., Disp: 30 tablet, Rfl: 5    OBJECTIVE:  /82 (BP Location: Right arm, Patient Position: Sitting, Cuff Size: Adult)   Pulse 86   Temp 97.8 °F (36.6 °C) (Temporal)   Resp 16   Ht 162.6 cm (64\")   Wt 57.2 kg (126 lb)   LMP 01/09/2023   SpO2 100%   BMI 21.63 kg/m²    Physical Exam  Constitutional:       General: She is not in acute distress.  Cardiovascular:      Rate and Rhythm: Normal rate and regular rhythm.      Heart sounds: Normal heart sounds.   Pulmonary:      Breath sounds: Normal breath sounds. "   Abdominal:      Tenderness: There is no abdominal tenderness. There is no guarding or rebound.      Comments: No tenderness with palpation today         ASSESSMENT/PLAN:     Diagnoses and all orders for this visit:    1. Dysmenorrhea (Primary)  -     US Non-ob Transvaginal; Future    2. Pelvic pain  -     US Non-ob Transvaginal; Future    Given description of severity of pain, will obtain ultrasound today. She starts her period in 2 days. She is scheduled for follow up with OB-Gyn in about 2 weeks. Continue motrin as needed for period cramps.       BMI is within normal parameters. No other follow-up for BMI required.       An After Visit Summary was printed and given to the patient at discharge.  Return if symptoms worsen or fail to improve.          JOSEPH Ornelas 2/3/2023   Electronically signed.

## 2023-02-16 ENCOUNTER — OFFICE VISIT (OUTPATIENT)
Dept: OBSTETRICS AND GYNECOLOGY | Facility: CLINIC | Age: 33
End: 2023-02-16
Payer: COMMERCIAL

## 2023-02-16 VITALS
DIASTOLIC BLOOD PRESSURE: 74 MMHG | WEIGHT: 126.8 LBS | BODY MASS INDEX: 21.65 KG/M2 | SYSTOLIC BLOOD PRESSURE: 118 MMHG | HEIGHT: 64 IN

## 2023-02-16 DIAGNOSIS — N94.6 DYSMENORRHEA: ICD-10-CM

## 2023-02-16 DIAGNOSIS — N97.9 FEMALE FERTILITY PROBLEM: Primary | ICD-10-CM

## 2023-02-16 DIAGNOSIS — N80.9 ENDOMETRIOSIS: ICD-10-CM

## 2023-02-16 PROCEDURE — 99204 OFFICE O/P NEW MOD 45 MIN: CPT | Performed by: OBSTETRICS & GYNECOLOGY

## 2023-02-16 NOTE — PROGRESS NOTES
Jerry Dominguez MD  [facility]  [address]  [place] [address]  Office [phone]  Fax [phone]      [facility]  Emerita Hutchins  : 1990  MRN: [id number]    Subjective   Subjective     Chief Complaint   Patient presents with   • Pelvic Pain     Patient here to establish care- had pap 2021 NILM, ET zone absent. Patient also wants to discuss endometriosis. Patient has pelvic pain during her periods- diffuse through entire abdomen. Patient also has pain with bowel movements. Patient been trying to get pregnant since 2022. Cycles are very regular- bleeding is heavy 1st day and then no bleeding by day 3. Patient last pelvic and beast exam in Oct/2022.        History of Present Illness  Emerita Hutchins is a 32 y.o. female , , who comes to the office today for consultation.  Patient has been attempting pregnancy since 2022.  She has been having her cycles using her phone temitope as well as using ovulation predictor kits.  She had her  do a semen analysis since it was reported normal.  She is taking prenatal vitamins at this time.  She does endorse significant stress recently particularly from a new job, and family matters.  She is a nurse practitioner.  In addition, she stopped birth control back in 2022.  She was on OCPs for approximately 12 years.  Her cycles are every 28 days with 3 to 4 days of flow.  For the past 3 months though, she reports increasingly painful periods for the first 2 days.  During this time, it states she feels like she is having contractions and may have rebound tenderness.  She states bowel movements as well as voids during these days were extremely painful.  She denies dyspareunia but states did have 1 episode of this in the past.  Diclofen and heating pads alleviating her pain.  She states she can tolerate through the pain with these current therapies.  Of note, she denies a history of infertility in her family.  She reports  her partner has not fathered any prior children as well.  She states she has never missed a cycle in her life since menarche.        Review of Systems   Gastrointestinal: Positive for abdominal pain.   Genitourinary: Positive for pelvic pain. Negative for decreased urine volume, difficulty urinating, dyspareunia, dysuria, enuresis, flank pain, frequency, genital sores, hematuria, menstrual problem, urgency, vaginal bleeding, vaginal discharge and vaginal pain.   All other systems reviewed and are negative.       OB hx:  OB History    Para Term  AB Living   0 0 0 0 0 0   SAB IAB Ectopic Molar Multiple Live Births   0 0 0 0 0 0        GYN hx:  Date of LMP: Patient's last menstrual period was 2023 (exact date).  Age at menarche: 13 yo    Menopause: n/a  Menses: 28 days  Flow: 3-4 days  Date/Result of last Pap smear: she reports her last PAP was normal   History of abnormal PAP: No  Date/Result of last mammogram: she has never had a mammogram  History of Abnormal Mammogram: n/a  Date/Result of last colonoscopy: has not had colonoscopy.  This was recommended.  Age 47  History of Abnormal colonoscopy: No  Date/Result of last DEXA: None  History of Abnormal DEXA: No  HPV Vaccination: No  History of Cervical Dysplasia: No  History of Vulvar Dysplasia: No  History of Sexually Transmitted Infection: No  Current Birth Control Method: not using any form of contraception because she is currently trying to conceive  History of Contraception: Yes. Details:   -h/o OCPs 12 years  HRT: No  Sexually active: Yes. Details:    FMH of Breast, Uterine, Ovarian, or Colon cancer: Yes. Details:   -mother: breast cancer dx 50, BRCA pending  Additional OB/GYN History (not otherwise listed):  -n/a    Personal History     The following portions of the patient's history were reviewed and updated as appropriate: allergies, current medications, past family history, past medical history, past social history, past  "surgical history and problem list.    History Review Reviewed Comments   Past Medical History:  [x]     Past Surgical History: [x]     Family History: [x]     Social History: [x]       Current Outpatient Medications   Medication Instructions   • buPROPion XL (WELLBUTRIN XL) 150 mg, Oral, Daily   • buPROPion XL (WELLBUTRIN XL) 150 mg, Oral, Daily   • omeprazole (PRILOSEC) 20 mg, Oral, Daily   • prenatal vitamin tablet 1 tablet, Oral, Daily       No Known Allergies    Objective    Objective     Vitals:   Visit Vitals  /74   Ht 162.6 cm (64\")   Wt 57.5 kg (126 lb 12.8 oz)   LMP 02/06/2023 (Exact Date)   BMI 21.77 kg/m²        Physical Exam  Vitals reviewed.   Constitutional:       General: She is not in acute distress.     Appearance: Normal appearance. She is not ill-appearing.   HENT:      Head: Normocephalic and atraumatic.      Nose: No congestion or rhinorrhea.   Eyes:      General: No scleral icterus.        Right eye: No discharge.         Left eye: No discharge.      Extraocular Movements: Extraocular movements intact.      Conjunctiva/sclera: Conjunctivae normal.   Pulmonary:      Effort: Pulmonary effort is normal. No accessory muscle usage or respiratory distress.   Musculoskeletal:      Right lower leg: No edema.      Left lower leg: No edema.   Skin:     General: Skin is warm and dry.      Coloration: Skin is not ashen, cyanotic or jaundiced.   Neurological:      General: No focal deficit present.      Mental Status: She is alert and oriented to person, place, and time.   Psychiatric:         Mood and Affect: Mood normal.         Behavior: Behavior is cooperative.         Result Review    SCANNED - PAP SMEAR (09/13/2021 00:00)  NILM    US Non-ob Transvaginal (02/03/2023 11:43)  IMPRESSION:  1. Endometrial thickness of 9 mm, within normal limits. No sonographic  evidence of ovarian torsion. No solid adnexal mass.          Assessment & Plan   Assessment / Plan     Diagnoses and all orders for this " visit:    1. Female fertility problem (Primary)  -     Antimullerian Hormone (AMH); Future  -     CBC (No Diff); Future  -     Hemoglobin A1c; Future  -     Progesterone; Future  -     Prolactin; Future  -     TSH Rfx On Abnormal To Free T4; Future    2. Dysmenorrhea    3. Endometriosis        Discussion: I reviewed the patient's ultrasound with her.  The pelvic ultrasound is otherwise unremarkable.  I discussed with her her symptoms which likely reflect endometriosis or adenomyosis.  As such, I discussed with her that the treatment to help control her menses and pain would be to be contraceptives however this is counterintuitive in her planning for fertility.  As she is an only been trying for approximately 8 months, I encouraged her to keep attempting as majority the time, pregnancy is achieved within the first 12 months of attempting.  I discussed we can consider evaluation starting with labs and a semen analysis from her partner.  She wants to pursue this option.  In review of her menses and timing, she typically has her start of her menses on the sixth of each month.  She ovulates on day 15 based on home ovulation predictor kits.  Her last menstrual period was 2/6/2023.  I will have her return next week which will be 2/21 for progesterone only as this is when she is supposed ovulate.  I will then have her return for day 3 labs (3/7/23).  Encouraged her to work on her stress levels as this can significantly impact her fertility.  She will return and we will review the above.    Follow-up: Return in about 4 weeks (around 3/16/2023) for GYN f/u, 15 min.    Jerry Dominguez MD

## 2023-02-17 ENCOUNTER — PATIENT ROUNDING (BHMG ONLY) (OUTPATIENT)
Dept: OBSTETRICS AND GYNECOLOGY | Facility: CLINIC | Age: 33
End: 2023-02-17
Payer: COMMERCIAL

## 2023-02-17 NOTE — PROGRESS NOTES
February 17, 2023    Hello, may I speak with Emerita Hutchins?    My name is Cecily    I am  with [facility]  [address], [address]  [place] [address] [phone].    Before we get started may I verify your date of birth? 1990    I am calling to officially welcome you to our practice and ask about your recent visit. Is this a good time to talk? yes    Tell me about your visit with us. What things went well?  great , had a little wait but not bad       We're always looking for ways to make our patients' experiences even better. Do you have recommendations on ways we may improve?  no    Overall were you satisfied with your first visit to our practice? yes       I appreciate you taking the time to speak with me today. Is there anything else I can do for you? no      Thank you, and have a great day.

## 2023-02-21 DIAGNOSIS — N97.9 FEMALE FERTILITY PROBLEM: Primary | ICD-10-CM

## 2023-02-24 LAB — MIS SERPL-MCNC: 3.2 NG/ML

## 2023-03-16 ENCOUNTER — OFFICE VISIT (OUTPATIENT)
Dept: OBSTETRICS AND GYNECOLOGY | Facility: CLINIC | Age: 33
End: 2023-03-16
Payer: COMMERCIAL

## 2023-03-16 ENCOUNTER — PATIENT MESSAGE (OUTPATIENT)
Dept: OBSTETRICS AND GYNECOLOGY | Facility: CLINIC | Age: 33
End: 2023-03-16

## 2023-03-16 VITALS
DIASTOLIC BLOOD PRESSURE: 74 MMHG | SYSTOLIC BLOOD PRESSURE: 112 MMHG | BODY MASS INDEX: 21.85 KG/M2 | HEIGHT: 64 IN | WEIGHT: 128 LBS

## 2023-03-16 DIAGNOSIS — N94.6 DYSMENORRHEA: ICD-10-CM

## 2023-03-16 DIAGNOSIS — N97.9 FEMALE FERTILITY PROBLEM: Primary | ICD-10-CM

## 2023-03-16 DIAGNOSIS — N80.9 ENDOMETRIOSIS: ICD-10-CM

## 2023-03-16 DIAGNOSIS — Z31.41 ENCOUNTER FOR FERTILITY TESTING: Primary | ICD-10-CM

## 2023-03-16 PROCEDURE — 99213 OFFICE O/P EST LOW 20 MIN: CPT | Performed by: OBSTETRICS & GYNECOLOGY

## 2023-03-16 NOTE — PROGRESS NOTES
"    Jerry Dominguez MD  [facility]  [address]  [place] [address]  Office [phone]  Fax [phone]      [facility]  Emerita Hutchins  : 1990  MRN: [id number]    Subjective   Subjective     Chief Complaint   Patient presents with   • Infertility     Patient here to follow up on infertility labs and ovulation kits. Patient has been trying to conceieve for 9 months. Patient states her period was last week.       History of Present Illness  Emerita Hutchins is a 32 y.o. female , , who comes to the office today for fertility follow-up. No complaints. Reports status quo for menses. Still painful for the first two days of bleeding but controlled with OTC medications and heating pads. Still having positive OPKs. Partner completing semen analysis.      Review of Systems   Genitourinary: Positive for menstrual problem. Negative for decreased urine volume, difficulty urinating, dyspareunia, dysuria, enuresis, flank pain, frequency, genital sores, hematuria, pelvic pain, urgency, vaginal bleeding, vaginal discharge and vaginal pain.   All other systems reviewed and are negative.       The following portions of the patient's history were reviewed and updated as appropriate: allergies, current medications, past family history, past medical history, past social history, past surgical history and problem list.    Objective    Objective     Vitals:   Visit Vitals  /74   Ht 162.6 cm (64\")   Wt 58.1 kg (128 lb)   LMP 2023   BMI 21.97 kg/m²        Physical Exam  Vitals reviewed.   Constitutional:       General: She is not in acute distress.     Appearance: Normal appearance. She is not ill-appearing.   HENT:      Head: Normocephalic and atraumatic.      Nose: No congestion or rhinorrhea.   Eyes:      General: No scleral icterus.        Right eye: No discharge.         Left eye: No discharge.      Extraocular Movements: Extraocular movements intact.      Conjunctiva/sclera: Conjunctivae " normal.   Pulmonary:      Effort: Pulmonary effort is normal. No accessory muscle usage or respiratory distress.   Musculoskeletal:      Right lower leg: No edema.      Left lower leg: No edema.   Skin:     General: Skin is warm and dry.      Coloration: Skin is not ashen, cyanotic or jaundiced.   Neurological:      General: No focal deficit present.      Mental Status: She is alert and oriented to person, place, and time.   Psychiatric:         Mood and Affect: Mood normal.         Behavior: Behavior is cooperative.           Result Review    Progesterone (02/21/2023 13:48)  Antimullerian Hormone (AMH) (02/21/2023 13:49)  CBC (No Diff) (03/07/2023 12:30)  Hemoglobin A1c (03/07/2023 12:30)  Prolactin (03/07/2023 12:30)  TSH Rfx On Abnormal To Free T4 (03/07/2023 12:30)          Assessment & Plan   Assessment / Plan     Diagnoses and all orders for this visit:    1. Female fertility problem (Primary)    2. Dysmenorrhea    3. Endometriosis        Discussion:   Dysmenorrhea/Endometriosis - c/w OTC medications. She is also interested in pelvic pain evaluation including diagnostic laparoscopy. Laparoscopy with fulguration of endometriosis reviewed with patient. Discussed again contraception for endometriosis and dysmenorrhea but this is counterintuitive as she is attempting pregnancy. Discussed if surgery or HSG, fertility window may increase (ie higher chance of pregnancy) in the following 6 months. She expressed understanding.     Labs reviewed with patient. Discussed as these are normal, need to check semen analysis. Also suggested hysterosalpingogram. She will consider this. She expressed understanding of the above. Her questions were answered. Semen analysis kit provided to her for her partner. RTC if pregnant. She will let us know if she would like to proceed with HSG or diagnostic laparoscopy.     Follow-up: Return if symptoms worsen or fail to improve.    Jerry Dominguez MD

## 2023-03-20 DIAGNOSIS — N97.9 FEMALE FERTILITY PROBLEM: Primary | ICD-10-CM

## 2023-03-20 NOTE — TELEPHONE ENCOUNTER
Patient wants to schedule the HSG as discussed at previous visit per visit note. I pended the order.

## 2023-03-20 NOTE — TELEPHONE ENCOUNTER
From: Emerita Hutchins  To: Jerry Dominguez  Sent: 3/16/2023 3:04 PM CDT  Subject: Question    I completely forgot to ask while I was there……wound an MRI show endo prior to me having the exploratory lap? I’m leaning towards having it done but if an MRI could show the extent of it before I have the lap done then maybe it could help me decide which route I want to take? Just thought I’d check and see what your thoughts were. Thanks!

## 2023-04-26 ENCOUNTER — OFFICE VISIT (OUTPATIENT)
Dept: OBSTETRICS AND GYNECOLOGY | Facility: CLINIC | Age: 33
End: 2023-04-26
Payer: COMMERCIAL

## 2023-04-26 VITALS
WEIGHT: 126.8 LBS | HEIGHT: 64 IN | SYSTOLIC BLOOD PRESSURE: 128 MMHG | BODY MASS INDEX: 21.65 KG/M2 | DIASTOLIC BLOOD PRESSURE: 84 MMHG

## 2023-04-26 DIAGNOSIS — N92.3 INTERMENSTRUAL BLEEDING: ICD-10-CM

## 2023-04-26 DIAGNOSIS — N92.6 IRREGULAR MENSES: ICD-10-CM

## 2023-04-26 DIAGNOSIS — N93.9 VAGINA BLEEDING: Primary | ICD-10-CM

## 2023-04-26 DIAGNOSIS — N93.0 POSTCOITAL BLEEDING: ICD-10-CM

## 2023-04-26 LAB
B-HCG UR QL: NEGATIVE
EXPIRATION DATE: NORMAL
INTERNAL NEGATIVE CONTROL: NEGATIVE
INTERNAL POSITIVE CONTROL: POSITIVE
Lab: NORMAL

## 2023-04-26 NOTE — PROGRESS NOTES
"    Jerry Dominguez MD  [facility]  [address]  [place] [address]  Office [phone]  Fax [phone]      [facility]  Emerita Hutchins  : 1990  MRN: [id number]    Subjective   Subjective     Chief Complaint   Patient presents with   • Infertility     Patient sent a message with concerns about recent onset postcoital bleeding x4 and changes in her cycle length. Patient reports starting her periods earlier than expected, and she has few days during the month that she is not at least spotting. This past week, ovulation on Tuesday and bleeding on Saturday.       History of Present Illness  Emerita Hutchins is a 32 y.o. female , , who comes to the office today for the above. Irregular menses/intermenstrual bleeding as well as postcoital bleeding. Ongoing for past 2 months. Ovulating by home testing. Having unprotected sex. HSG pending - delayed secondary to menses timing difficulties.      Review of Systems   Genitourinary: Positive for menstrual problem and vaginal bleeding. Negative for decreased urine volume, difficulty urinating, dyspareunia, dysuria, enuresis, flank pain, frequency, genital sores, hematuria, pelvic pain, urgency, vaginal discharge and vaginal pain.   All other systems reviewed and are negative.       The following portions of the patient's history were reviewed and updated as appropriate: allergies, current medications, past family history, past medical history, past social history, past surgical history and problem list.    Objective    Objective     Vitals:   Visit Vitals  /84   Ht 162.6 cm (64\")   Wt 57.5 kg (126 lb 12.8 oz)   BMI 21.77 kg/m²        Physical Exam  Vitals and nursing note reviewed. Exam conducted with a chaperone present.   Constitutional:       General: She is not in acute distress.     Appearance: Normal appearance. She is not ill-appearing.   HENT:      Head: Normocephalic and atraumatic.      Nose: No congestion or rhinorrhea.   Eyes: "      General: No scleral icterus.        Right eye: No discharge.         Left eye: No discharge.      Extraocular Movements: Extraocular movements intact.      Conjunctiva/sclera: Conjunctivae normal.   Pulmonary:      Effort: Pulmonary effort is normal. No accessory muscle usage or respiratory distress.   Genitourinary:     General: Normal vulva.      Exam position: Lithotomy position.      Pubic Area: No rash or pubic lice.       Labia:         Right: No rash, tenderness or lesion.         Left: No rash, tenderness or lesion.       Urethra: No prolapse or urethral lesion.      Vagina: No signs of injury and foreign body. No vaginal discharge, erythema, tenderness, bleeding, lesions or prolapsed vaginal walls.      Cervix: Cervical bleeding present.      Rectum: No external hemorrhoid.            Comments: Consent for exam obtained verbally from patient.  Musculoskeletal:      Right lower leg: No edema.      Left lower leg: No edema.   Skin:     General: Skin is warm and dry.      Coloration: Skin is not ashen, cyanotic or jaundiced.   Neurological:      General: No focal deficit present.      Mental Status: She is alert and oriented to person, place, and time.   Psychiatric:         Mood and Affect: Mood normal.         Behavior: Behavior is cooperative.           Result Review    POC Pregnancy, Urine (04/26/2023 10:57)  negative        Assessment & Plan   Assessment / Plan     Diagnoses and all orders for this visit:    1. Vagina bleeding (Primary)  -     POC Pregnancy, Urine    2. Postcoital bleeding    3. Irregular menses    4. Intermenstrual bleeding        Discussion:   Check urine pregnancy.  RTC for pelvic US and EMB.  Discussed possible need for Provera to reset menses pending US/EMB.   Her questions were answered. She expressed understanding.     Follow-up: Return in about 1 week (around 5/3/2023) for GYN US, GYN f/u, EMB.    Jerry Dominguez MD

## 2023-05-01 RX ORDER — BUPROPION HYDROCHLORIDE 150 MG/1
150 TABLET ORAL DAILY
Qty: 30 TABLET | Refills: 5 | Status: SHIPPED | OUTPATIENT
Start: 2023-05-01

## 2023-05-10 ENCOUNTER — OFFICE VISIT (OUTPATIENT)
Dept: OBSTETRICS AND GYNECOLOGY | Facility: CLINIC | Age: 33
End: 2023-05-10
Payer: COMMERCIAL

## 2023-05-10 ENCOUNTER — HOSPITAL ENCOUNTER (OUTPATIENT)
Dept: GENERAL RADIOLOGY | Facility: HOSPITAL | Age: 33
Discharge: HOME | End: 2023-05-10
Payer: COMMERCIAL

## 2023-05-10 VITALS
HEIGHT: 64 IN | WEIGHT: 126.4 LBS | BODY MASS INDEX: 21.58 KG/M2 | SYSTOLIC BLOOD PRESSURE: 110 MMHG | DIASTOLIC BLOOD PRESSURE: 72 MMHG

## 2023-05-10 DIAGNOSIS — N97.9 FEMALE FERTILITY PROBLEM: ICD-10-CM

## 2023-05-10 DIAGNOSIS — N92.3 INTERMENSTRUAL BLEEDING: Primary | ICD-10-CM

## 2023-05-10 DIAGNOSIS — N93.0 POSTCOITAL BLEEDING: ICD-10-CM

## 2023-05-10 PROCEDURE — 88305 TISSUE EXAM BY PATHOLOGIST: CPT | Performed by: OBSTETRICS & GYNECOLOGY

## 2023-05-10 PROCEDURE — 25510000001 IOPAMIDOL 61 % SOLUTION: Performed by: OBSTETRICS & GYNECOLOGY

## 2023-05-10 PROCEDURE — 74740 X-RAY FEMALE GENITAL TRACT: CPT

## 2023-05-10 RX ADMIN — IOPAMIDOL 15 ML: 612 INJECTION, SOLUTION INTRATHECAL at 13:14

## 2023-05-10 NOTE — PROGRESS NOTES
"    Jerry Dominguez MD  [facility]  [address]  [place] [address]  Office [phone]  Fax [phone]      [facility]  Emerita Hutchins  : 1990  MRN: [id number]    Subjective   Subjective     Chief Complaint   Patient presents with   • Follow-up     Patient here for follow up on irregular bleeding and postcoital bleeding.       History of Present Illness  Emerita Hutchins is a 32 y.o. female , , who comes to the office today for EMB. Cycle day 10. No intermenstrual bleeding since last cycle. Has not resumed sexual activity since last cycle secondary to preparation for HSG.       Objective    Objective     Vitals:   Visit Vitals  /72   Ht 162.6 cm (64\")   Wt 57.3 kg (126 lb 6.4 oz)   LMP 2023   BMI 21.70 kg/m²        Physical Exam  Vitals and nursing note reviewed. Exam conducted with a chaperone present.   Constitutional:       General: She is not in acute distress.     Appearance: Normal appearance. She is not ill-appearing.   HENT:      Head: Normocephalic and atraumatic.      Nose: No congestion or rhinorrhea.   Eyes:      General: No scleral icterus.        Right eye: No discharge.         Left eye: No discharge.      Extraocular Movements: Extraocular movements intact.      Conjunctiva/sclera: Conjunctivae normal.   Pulmonary:      Effort: Pulmonary effort is normal. No accessory muscle usage or respiratory distress.   Musculoskeletal:      Right lower leg: No edema.      Left lower leg: No edema.   Skin:     General: Skin is warm and dry.      Coloration: Skin is not ashen, cyanotic or jaundiced.   Neurological:      General: No focal deficit present.      Mental Status: She is alert and oriented to person, place, and time.   Psychiatric:         Mood and Affect: Mood normal.         Behavior: Behavior is cooperative.         Procedure   The following procedures were performed in the clinic today:  Endometrial Biopsy    Date/Time: 5/10/2023 2:48 PM  Performed by: " Jerry Dmoinguez MD  Authorized by: Jerry Dominguez MD     Consent:     Consent obtained: verbal    Consent given by: patient    Risks discussed: bleeding, infection, need for repeat procedure and pain    Alternatives discussed: alternative treatment    Patient agrees, verbalizes understanding, and wants to proceed: yes    Universal protocol:     Immediately prior to procedure a time out was called: yes      Patient identity confirmed: verbally with patient  Pre-procedure:     Urine pregnancy test: negative    Procedure:     Prepped with: Betadine    Tenaculum used: yes      A local block was performed: no      Cervix dilated: no      Number of passes: 1  Findings:     Cervix: normal      Uterus depth by sound (cm): 6    Specimen collected: specimen collected and sent to pathology      Patient tolerance: tolerated well, no immediate complications             Result Review    POC Pregnancy, Urine (05/10/2023 12:46) negative    US Non-ob Transvaginal (05/10/2023 14:20)  Uterus 6.6cm, anteverted  endometrium 8 mm, trilaminar appearance  Left ovary with prominent follicle  Right ovary appears normal    FL Hysterosalpingogram (05/10/2023 13:06)  IMPRESSION:  1. Normal appearance of the uterine cavity, without filling defect or  morphologic variation.  2. Both tubes are patent and without filling defect. Appropriate  contrast spillage into the adnexa.            Assessment & Plan   Assessment / Plan     Diagnoses and all orders for this visit:    1. Intermenstrual bleeding (Primary)  -     POC Pregnancy, Urine  -     Tissue Pathology Exam    2. Female fertility problem    3. Postcoital bleeding  -     Tissue Pathology Exam    Other orders  -     Endometrial Biopsy        Discussion: Postprocedure care discussed. Will call/MyChart with EMB results. HSG and ultrasound reviewed with patient. Discussed that next 6 months is prime for attempting pregnancy as now s/p HSG. Her questions were answered. She expressed understanding.      Follow-up: Return in about 6 months (around 11/10/2023) for GYN f/u.    Jerry Dominguez MD

## 2023-05-12 LAB
CYTO UR: NORMAL
LAB AP CASE REPORT: NORMAL
Lab: NORMAL
PATH REPORT.FINAL DX SPEC: NORMAL
PATH REPORT.GROSS SPEC: NORMAL

## 2023-06-05 ENCOUNTER — OFFICE VISIT (OUTPATIENT)
Dept: INTERNAL MEDICINE | Facility: CLINIC | Age: 33
End: 2023-06-05
Payer: COMMERCIAL

## 2023-06-05 VITALS
SYSTOLIC BLOOD PRESSURE: 114 MMHG | HEIGHT: 64 IN | TEMPERATURE: 97.7 F | OXYGEN SATURATION: 98 % | HEART RATE: 82 BPM | DIASTOLIC BLOOD PRESSURE: 80 MMHG | WEIGHT: 131.8 LBS | BODY MASS INDEX: 22.5 KG/M2

## 2023-06-05 DIAGNOSIS — M62.838 MUSCLE SPASMS OF NECK: Primary | ICD-10-CM

## 2023-06-05 PROCEDURE — 99213 OFFICE O/P EST LOW 20 MIN: CPT | Performed by: INTERNAL MEDICINE

## 2023-06-05 RX ORDER — METHYLPREDNISOLONE 4 MG/1
TABLET ORAL
Qty: 21 EACH | Refills: 0 | Status: SHIPPED | OUTPATIENT
Start: 2023-06-05 | End: 2023-06-05 | Stop reason: SDUPTHER

## 2023-06-05 RX ORDER — CYCLOBENZAPRINE HCL 5 MG
5 TABLET ORAL 3 TIMES DAILY PRN
Qty: 30 TABLET | Refills: 0 | Status: SHIPPED | OUTPATIENT
Start: 2023-06-05

## 2023-06-05 RX ORDER — METHYLPREDNISOLONE 4 MG/1
TABLET ORAL
Qty: 21 TABLET | Refills: 0 | Status: SHIPPED | OUTPATIENT
Start: 2023-06-05

## 2023-06-05 RX ORDER — CYCLOBENZAPRINE HCL 5 MG
5 TABLET ORAL 3 TIMES DAILY PRN
Qty: 30 TABLET | Refills: 0 | Status: SHIPPED | OUTPATIENT
Start: 2023-06-05 | End: 2023-06-05 | Stop reason: SDUPTHER

## 2023-06-05 NOTE — PROGRESS NOTES
Chief Complaint   Patient presents with    Neck Pain         History:  Emerita Hutchins is a 32 y.o. female who presents today for evaluation of the above problems.      Emerita presents today for an acute visit for left-sided neck pain.  She had symptoms last month which resolved after going to her chiropractor.  Last night the left side of her neck started to hurt again and progressively worsened over this morning.  Last night she could not stand up because of the tightness in the left side of her neck.  She has been taking Motrin and Tylenol.  She had an old prescription of Norflex which did not help her symptoms.    She is leaving town tomorrow to [place] for a hiking trip.  Since she is going on a trip she felt it best to be evaluated today.      HPI      ROS:  Review of Systems    As above      Current Outpatient Medications:     buPROPion XL (Wellbutrin XL) 150 MG 24 hr tablet, Take 1 tablet by mouth Daily., Disp: 30 tablet, Rfl: 5    cyclobenzaprine (FLEXERIL) 5 MG tablet, Take 1 tablet by mouth 3 (Three) Times a Day As Needed for Muscle Spasms., Disp: 30 tablet, Rfl: 0    methylPREDNISolone (MEDROL) 4 MG dose pack, Follow package directions., Disp: 21 tablet, Rfl: 0    prenatal vitamin tablet, Take 1 tablet by mouth Daily., Disp: , Rfl:     buPROPion XL (Wellbutrin XL) 150 MG 24 hr tablet, Take 1 tablet by mouth Daily for 30 days., Disp: 30 tablet, Rfl: 5    Lab Results   Component Value Date    GLUCOSE 87 08/19/2022    BUN 18 08/19/2022    CREATININE 0.92 08/19/2022    EGFR 86.6 08/19/2022    BCR 19.6 08/19/2022    K 4.0 08/19/2022    CO2 22.0 08/19/2022    CALCIUM 9.3 08/19/2022    ALBUMIN 4.90 08/19/2022    BILITOT 0.5 08/19/2022    AST 18 08/19/2022    ALT 18 08/19/2022       WBC   Date Value Ref Range Status   03/07/2023 8.05 3.40 - 10.80 10*3/mm3 Final   04/29/2019 5.8 4.0 - 10.5 10*3/uL Final     RBC   Date Value Ref Range Status   03/07/2023 4.10 3.77 - 5.28 10*6/mm3 Final   04/29/2019 4.70 4.20 -  5.40 10*6/uL Final     Hemoglobin   Date Value Ref Range Status   03/07/2023 11.9 (L) 12.0 - 15.9 g/dL Final   08/19/2021 13.3 12.0 - 15.9 g/dL Final   04/29/2019 13.3 12.5 - 16.0 g/dL Final     Hematocrit   Date Value Ref Range Status   03/07/2023 36.2 34.0 - 46.6 % Final   08/19/2021 40.6 34.0 - 46.6 % Final   04/29/2019 41.0 37.0 - 47.0 % Final     MCV   Date Value Ref Range Status   03/07/2023 88.3 79.0 - 97.0 fL Final   08/19/2021 87.9 79.0 - 97.0 fL Final   04/29/2019 87.2 78.0 - 100.0 fL Final     MCH   Date Value Ref Range Status   03/07/2023 29.0 26.6 - 33.0 pg Final   08/19/2021 28.8 26.6 - 33.0 pg Final   04/29/2019 28.3 27.0 - 37.0 pg Final     MCHC   Date Value Ref Range Status   03/07/2023 32.9 31.5 - 35.7 g/dL Final   08/19/2021 32.8 31.5 - 35.7 g/dL Final   04/29/2019 32.4 32.0 - 36.0 g/dL Final     RDW   Date Value Ref Range Status   03/07/2023 12.3 12.3 - 15.4 % Final   08/19/2021 12.0 (L) 12.3 - 15.4 % Final   04/29/2019 12.6 11.5 - 14.5 % Final     RDW-SD   Date Value Ref Range Status   04/29/2019 40.2 36.4 - 46.3 fL Final     MPV   Date Value Ref Range Status   08/19/2021 8.4 6.0 - 12.0 fL Final   04/29/2019 9.4 6.0 - 10.8 fL Final     Platelets   Date Value Ref Range Status   03/07/2023 253 140 - 450 10*3/mm3 Final   08/19/2021 339 140 - 450 10*3/mm3 Final   04/29/2019 341 150 - 450 10*3/uL Final     Neutrophil Rel %   Date Value Ref Range Status   04/29/2019 59.4 % Final     Lymphocyte Rel %   Date Value Ref Range Status   04/29/2019 31.3 % Final     Monocyte Rel %   Date Value Ref Range Status   04/29/2019 7.0 % Final     Eosinophil %   Date Value Ref Range Status   04/29/2019 1.4 % Final     Basophil Rel %   Date Value Ref Range Status   04/29/2019 0.7 % Final     Immature Grans %   Date Value Ref Range Status   04/29/2019 0.2 % Final     Neutrophils Absolute   Date Value Ref Range Status   04/29/2019 3.5 1.5 - 6.6 10*3/uL Final     Lymphocytes Absolute   Date Value Ref Range Status  "  04/29/2019 1.8 1.0 - 3.5 10*3/uL Final     Monocytes Absolute   Date Value Ref Range Status   04/29/2019 0.4 0.0 - 1.0 10*3/uL Final     Eosinophils Absolute   Date Value Ref Range Status   04/29/2019 0.1 0.0 - 0.7 10*3/uL Final     Basophils Absolute   Date Value Ref Range Status   04/29/2019 0.0 0.0 - 0.1 10*3/uL Final     Immature Grans, Absolute   Date Value Ref Range Status   04/29/2019 0.0 0.0 - 0.2 10*3/uL Final     nRBC   Date Value Ref Range Status   04/29/2019 0.00 % Final         OBJECTIVE:  Visit Vitals  /80 (BP Location: Left arm, Patient Position: Sitting, Cuff Size: Adult)   Pulse 82   Temp 97.7 °F (36.5 °C) (Temporal)   Ht 162.6 cm (64\")   Wt 59.8 kg (131 lb 12.8 oz)   SpO2 98%   BMI 22.62 kg/m²      Physical Exam  Constitutional:       General: She is not in acute distress.     Appearance: She is well-developed. She is not diaphoretic.   HENT:      Head: Normocephalic and atraumatic.   Eyes:      Pupils: Pupils are equal, round, and reactive to light.   Neck:      Thyroid: No thyromegaly.      Trachea: Phonation normal.   Pulmonary:      Effort: No respiratory distress.   Musculoskeletal:         General: Tenderness present.      Cervical back: Spasms and tenderness present. No deformity or bony tenderness. Pain with movement present. Decreased range of motion (Due to discomfort).        Back:    Skin:     Coloration: Skin is not pale.      Findings: No erythema.   Neurological:      Mental Status: She is alert.   Psychiatric:         Behavior: Behavior normal.         Thought Content: Thought content normal.         Judgment: Judgment normal.       Assessment/Plan    Diagnoses and all orders for this visit:    1. Muscle spasms of neck (Primary)  -     Discontinue: methylPREDNISolone (MEDROL) 4 MG dose pack; Take as directed on package instructions.  Dispense: 21 each; Refill: 0  -     Discontinue: cyclobenzaprine (FLEXERIL) 5 MG tablet; Take 1 tablet by mouth 3 (Three) Times a Day As Needed " for Muscle Spasms.  Dispense: 30 tablet; Refill: 0  -     cyclobenzaprine (FLEXERIL) 5 MG tablet; Take 1 tablet by mouth 3 (Three) Times a Day As Needed for Muscle Spasms.  Dispense: 30 tablet; Refill: 0  -     methylPREDNISolone (MEDROL) 4 MG dose pack; Follow package directions.  Dispense: 21 tablet; Refill: 0      I have prescribed a course of steroids and also as needed Flexeril to help with her symptoms.  Would expect her symptoms to improve over the next few days.  Recommend other conservative treatment as she is already doing with Tylenol, ibuprofen, ice and rest.    Return if symptoms persist or worsen.    Return for Next scheduled follow up with Dr. Dudley.      ASTRID Menjivar MD  13:39 CDT  6/5/2023

## 2023-08-31 ENCOUNTER — OFFICE VISIT (OUTPATIENT)
Dept: INTERNAL MEDICINE | Facility: CLINIC | Age: 33
End: 2023-08-31
Payer: COMMERCIAL

## 2023-08-31 VITALS
HEIGHT: 64 IN | WEIGHT: 131.4 LBS | HEART RATE: 69 BPM | OXYGEN SATURATION: 98 % | BODY MASS INDEX: 22.43 KG/M2 | RESPIRATION RATE: 16 BRPM | DIASTOLIC BLOOD PRESSURE: 83 MMHG | SYSTOLIC BLOOD PRESSURE: 122 MMHG

## 2023-08-31 DIAGNOSIS — Z23 NEED FOR VACCINATION: ICD-10-CM

## 2023-08-31 DIAGNOSIS — Z00.01 ANNUAL VISIT FOR GENERAL ADULT MEDICAL EXAMINATION WITH ABNORMAL FINDINGS: Primary | ICD-10-CM

## 2023-08-31 DIAGNOSIS — F32.A DEPRESSIVE DISORDER: ICD-10-CM

## 2023-08-31 DIAGNOSIS — M54.2 NECK PAIN WITHOUT INJURY: ICD-10-CM

## 2023-08-31 RX ORDER — BUPROPION HYDROCHLORIDE 150 MG/1
150 TABLET ORAL DAILY
Qty: 90 TABLET | Refills: 3 | Status: SHIPPED | OUTPATIENT
Start: 2023-08-31

## 2023-08-31 NOTE — PROGRESS NOTES
CC: f/u preventive health     History:  Emerita Hutchins is a 32 y.o. female who presents today for evaluation of the above problems.  She notes she has been doing well without acute illness. She has been having difficulty getting pregnant over the last 1.5 years and is working with Dr. Dominguez on workup for this. She has had stability in her moods on wellbutrin. Neck pain flares periodically with mild radiculopathy worse on right than left, but worse with sneezing.     ROS:  Review of Systems   Constitutional:  Negative for chills and fever.   HENT:  Negative for congestion and sore throat.    Eyes:  Negative for visual disturbance.   Respiratory:  Negative for cough and shortness of breath.    Cardiovascular:  Negative for chest pain and palpitations.   Gastrointestinal:  Negative for abdominal pain, constipation and nausea.   Endocrine: Negative for cold intolerance and heat intolerance.   Genitourinary:  Negative for difficulty urinating and frequency.   Musculoskeletal:  Positive for neck pain. Negative for arthralgias and back pain.   Skin:  Negative for rash.   Neurological:  Negative for dizziness and headaches.   Psychiatric/Behavioral:  Negative for dysphoric mood. The patient is not nervous/anxious.      No Known Allergies  Past Medical History:   Diagnosis Date    Anxiety     Depression     Family history of colonic polyps     Hypothyroidism      Past Surgical History:   Procedure Laterality Date    ENDOSCOPY N/A 1/18/2023    Procedure: ESOPHAGOGASTRODUODENOSCOPY WITH ANESTHESIA;  Surgeon: Desirae Gray MD;  Location: [facility] ENDOSCOPY;  Service: Gastroenterology;  Laterality: N/A;  pre GERD; FH esophageal ca  post normal  Chacorta Dudley, DO     Family History   Problem Relation Age of Onset    Diabetes Mother     Hypertension Mother     Hyperlipidemia Mother     Olvera's esophagus Mother     Breast cancer Mother         BRCA testing pending as of 02/16/23    Drug abuse Brother     Olvera's  "esophagus Maternal Grandmother     Dementia Maternal Grandmother     Colon polyps Maternal Grandfather         < 60 years of age    Cancer Maternal Grandfather     Esophageal cancer Maternal Grandfather     Colon cancer Neg Hx     Liver cancer Neg Hx     Liver disease Neg Hx     Rectal cancer Neg Hx     Stomach cancer Neg Hx       reports that she has never smoked. She has never been exposed to tobacco smoke. She has never used smokeless tobacco. She reports current alcohol use. She reports that she does not use drugs.      Current Outpatient Medications:     buPROPion XL (Wellbutrin XL) 150 MG 24 hr tablet, Take 1 tablet by mouth Daily., Disp: 90 tablet, Rfl: 3    prenatal vitamin tablet, Take 1 tablet by mouth Daily., Disp: , Rfl:     OBJECTIVE:  /83 (BP Location: Left arm, Patient Position: Sitting, Cuff Size: Adult)   Pulse 69   Resp 16   Ht 162.6 cm (64\")   Wt 59.6 kg (131 lb 6.4 oz)   LMP  (LMP Unknown)   SpO2 98%   BMI 22.55 kg/mý    Physical Exam  Constitutional:       General: She is not in acute distress.     Appearance: She is well-developed.   HENT:      Head: Normocephalic and atraumatic.      Right Ear: External ear normal.      Left Ear: External ear normal.   Eyes:      General: No scleral icterus.     Extraocular Movements: Extraocular movements intact.   Neck:      Trachea: No tracheal deviation.   Cardiovascular:      Rate and Rhythm: Normal rate and regular rhythm.      Heart sounds: Normal heart sounds. No murmur heard.  Pulmonary:      Effort: Pulmonary effort is normal. No accessory muscle usage or respiratory distress.      Breath sounds: Normal breath sounds. No wheezing.   Abdominal:      General: There is no distension.      Palpations: Abdomen is soft.      Tenderness: There is no abdominal tenderness.   Musculoskeletal:         General: Normal range of motion.      Cervical back: Normal range of motion and neck supple.      Right lower leg: No edema.      Left lower leg: No " edema.   Skin:     General: Skin is warm and dry.      Nails: There is no clubbing.   Neurological:      Mental Status: She is alert and oriented to person, place, and time.      Coordination: Coordination normal.      Gait: Gait normal.   Psychiatric:         Mood and Affect: Mood normal. Mood is not anxious or depressed.         Behavior: Behavior normal.       Assessment/Plan    Diagnoses and all orders for this visit:    1. Annual visit for general adult medical examination with abnormal findings (Primary)  Immunizations:      - Tetanus: Ordered and administered today      - Influenza: Recommend yearly.      - Prevnar: Once after age 65      - Shingrix: Series after 50      - COVID: Recommend booster when available next month.   CRC screening: Due at 45  Mammogram:  Due at 40  PAP: was done on approximately 9/2021 and the result was: normal PAP without HPV cotesting. Repeat 3 years.  DEXA: DEXA scan at 65  Well controlled and BP goal for age is <140/90 per JNC 8 guidelines  BMI is within normal parameters. No other follow-up for BMI required.    2. Depressive disorder  -     buPROPion XL (Wellbutrin XL) 150 MG 24 hr tablet; Take 1 tablet by mouth Daily.  Dispense: 90 tablet; Refill: 3  Well controlled on wellbutrin without desire for changes.     3. Neck pain without injury  Encouraged stretching and strengthening of core. Consider imaging, PT or other evaluation if not improving.     4. Need for vaccination  -     Tdap Vaccine Greater Than or Equal To 8yo IM        An After Visit Summary was printed and given to the patient at discharge.  Return in about 1 year (around 8/31/2024) for Annual physical.         Chacorta Dudley D.O. 8/31/2023   Electronically signed.

## 2023-10-10 ENCOUNTER — OFFICE VISIT (OUTPATIENT)
Dept: GASTROENTEROLOGY | Facility: CLINIC | Age: 33
End: 2023-10-10
Payer: COMMERCIAL

## 2023-10-10 VITALS
DIASTOLIC BLOOD PRESSURE: 76 MMHG | SYSTOLIC BLOOD PRESSURE: 118 MMHG | HEART RATE: 79 BPM | BODY MASS INDEX: 22.36 KG/M2 | OXYGEN SATURATION: 99 % | WEIGHT: 131 LBS | HEIGHT: 64 IN | TEMPERATURE: 98.2 F

## 2023-10-10 DIAGNOSIS — R14.0 BLOATING: Primary | ICD-10-CM

## 2023-10-10 PROCEDURE — 99213 OFFICE O/P EST LOW 20 MIN: CPT | Performed by: NURSE PRACTITIONER

## 2023-10-10 NOTE — PROGRESS NOTES
"Primary Physician: Chacorta Dudley DO    Chief Complaint   Patient presents with    Bloated     Pt c/o issues with bloating for \"a while\" but has been really bad for the last 6 months-states by the end of the day she looks \"6 months pregnant\"-doesn't seem to matter what she does/doesn't eat; Pt had endo 1/18/2023       Subjective     Emerita Hutchins is a 33 y.o. female.    HPI  Abdominal Bloating  Complains of bloating on a daily basis despite intake of food or drink.  She reports having No problems with her bowel habits. No diarrhea, or constipation  NO abdominal pain.    Has tried to avoid alcohol and breads and this has not helped with the bloating symptoms.    Last endoscopy 1/18/2023: examined esophagus, stomach normal, examined duodenum.  No small bowel biopsies were taken at that time.  She was however began on Omeprazole 20mg daily.    US non-Ob transvaginal US 5/10/2023: uterus normal and ovaries unremarkable.    Past Medical History:   Diagnosis Date    Anxiety     Depression     Family history of colonic polyps     Hypothyroidism        Past Surgical History:   Procedure Laterality Date    ENDOSCOPY N/A 01/18/2023    Normal esophagus; Normal stomach; Normal examined duodenum; No specimens collected        Current Outpatient Medications:     buPROPion XL (Wellbutrin XL) 150 MG 24 hr tablet, Take 1 tablet by mouth Daily., Disp: 90 tablet, Rfl: 3    prenatal vitamin tablet, Take 1 tablet by mouth Daily., Disp: , Rfl:     riFAXIMin (Xifaxan) 550 MG tablet, Take 1 tablet by mouth Every 12 (Twelve) Hours., Disp: 28 tablet, Rfl: 0    No Known Allergies    Social History     Socioeconomic History    Marital status:    Tobacco Use    Smoking status: Never     Passive exposure: Never    Smokeless tobacco: Never   Vaping Use    Vaping Use: Never used   Substance and Sexual Activity    Alcohol use: Yes     Comment: Occasionally    Drug use: Never    Sexual activity: Yes     Partners: Male     Birth " "control/protection: None       Family History   Problem Relation Age of Onset    Diabetes Mother     Hypertension Mother     Hyperlipidemia Mother     Olvera's esophagus Mother     Breast cancer Mother         BRCA testing pending as of 02/16/23    Drug abuse Brother     Olvera's esophagus Maternal Grandmother     Dementia Maternal Grandmother     Colon polyps Maternal Grandfather         < 60 years of age    Cancer Maternal Grandfather     Esophageal cancer Maternal Grandfather     Colon cancer Neg Hx     Liver cancer Neg Hx     Liver disease Neg Hx     Rectal cancer Neg Hx     Stomach cancer Neg Hx        Review of Systems   Constitutional:  Negative for unexpected weight change.   Gastrointestinal:  Positive for abdominal distention. Negative for constipation and diarrhea.       Objective     /76 (BP Location: Left arm, Patient Position: Sitting, Cuff Size: Adult)   Pulse 79   Temp 98.2 øF (36.8 øC) (Infrared)   Ht 162.6 cm (64\")   Wt 59.4 kg (131 lb)   SpO2 99%   Breastfeeding No   BMI 22.49 kg/mý     Physical Exam  Vitals reviewed.   Constitutional:       Appearance: Normal appearance.   Neurological:      Mental Status: She is alert.         Lab Results - Last 18 Months   Lab Units 08/19/22  1416   GLUCOSE mg/dL 87   BUN mg/dL 18   CREATININE mg/dL 0.92   SODIUM mmol/L 138   POTASSIUM mmol/L 4.0   CHLORIDE mmol/L 105   CO2 mmol/L 22.0   TOTAL PROTEIN g/dL 7.7   ALBUMIN g/dL 4.90   ALT (SGPT) U/L 18   AST (SGOT) U/L 18   ALK PHOS U/L 57   BILIRUBIN mg/dL 0.5   GLOBULIN gm/dL 2.8       Lab Results - Last 18 Months   Lab Units 03/07/23  1230   HEMOGLOBIN g/dL 11.9*   HEMATOCRIT % 36.2   MCV fL 88.3   WBC 10*3/mm3 8.05   RDW % 12.3   PLATELETS 10*3/mm3 253       Lab Results - Last 18 Months   Lab Units 03/07/23  1230 08/19/22  1416   TSH uIU/mL 1.890 2.040          IMPRESSION/PLAN:    Assessment & Plan      Problem List Items Addressed This Visit          Gastrointestinal Abdominal     Bloating - " Primary    Overview     Abdominal bloating daily despite any amount of food intake.  Last endoscopy 1/18/2023: examined esophagus, stomach normal, examined duodenum.  No small bowel biopsies were taken at that time.         Relevant Medications    riFAXIMin (Xifaxan) 550 MG tablet     In the event any of her bloating might be from SIBO we will do a trail of Xifaxan 550mg BID for 14 days. Then pt will message me through my chart to let me know how this does for her.   I have encouraged her to continue to avoid alcohol during this 14 days.  Also try to limit artificial sweeteners and sugar intake.              Izabela Mcgee, APRN  10/12/23  14:48 CDT    Part of this note may be an electronic transcription/translation of spoken language to printed text.

## 2023-10-17 DIAGNOSIS — N97.9 FEMALE FERTILITY PROBLEM: Primary | ICD-10-CM

## 2023-11-06 ENCOUNTER — OFFICE VISIT (OUTPATIENT)
Dept: OBSTETRICS AND GYNECOLOGY | Facility: CLINIC | Age: 33
End: 2023-11-06
Payer: COMMERCIAL

## 2023-11-06 VITALS
BODY MASS INDEX: 22.67 KG/M2 | WEIGHT: 132.8 LBS | SYSTOLIC BLOOD PRESSURE: 122 MMHG | HEIGHT: 64 IN | DIASTOLIC BLOOD PRESSURE: 74 MMHG

## 2023-11-06 DIAGNOSIS — Z12.4 SCREENING FOR MALIGNANT NEOPLASM OF CERVIX: ICD-10-CM

## 2023-11-06 DIAGNOSIS — Z01.419 ENCOUNTER FOR ANNUAL ROUTINE GYNECOLOGICAL EXAMINATION: Primary | ICD-10-CM

## 2023-11-06 PROCEDURE — G0123 SCREEN CERV/VAG THIN LAYER: HCPCS | Performed by: OBSTETRICS & GYNECOLOGY

## 2023-11-06 PROCEDURE — 87624 HPV HI-RISK TYP POOLED RSLT: CPT | Performed by: OBSTETRICS & GYNECOLOGY

## 2023-11-06 NOTE — PROGRESS NOTES
"    Jerry Dominguez MD  [facility]  [address]  [place] [address]  Office [phone]  Fax [phone]      [facility]  Emerita Hutchins  : 1990  MRN: [id number]    Subjective   Subjective     Chief Complaint   Patient presents with    Follow-up     Patient here for annual exam. Patient started period yesterday, states bleeding moderate-heavy today. Pap done 2021 NILM. Denies additional questions or concerns. Patient has appt with fertility doctor on 23.        History of Present Illness  Emerita Hutchins is a 33 y.o. female , , who comes to the office today for annual. No complaints. Patient's last menstrual period was 2023. Sees BERTIN on 23. No GYN complaints.   .      Review of Systems   Genitourinary:  Negative for decreased urine volume, difficulty urinating, dyspareunia, dysuria, enuresis, flank pain, frequency, genital sores, hematuria, menstrual problem, pelvic pain, urgency, vaginal bleeding, vaginal discharge and vaginal pain.   All other systems reviewed and are negative.       The following portions of the patient's history were reviewed and updated as appropriate: allergies, current medications, past family history, past medical history, past social history, past surgical history, and problem list.    Objective    Objective     Vitals:   Visit Vitals  /74   Ht 162.6 cm (64\")   Wt 60.2 kg (132 lb 12.8 oz)   LMP 2023   BMI 22.80 kg/m²        Physical Exam  Vitals reviewed. Exam conducted with a chaperone present (Consent for exam obtained verbally from patient.).   Constitutional:       General: She is not in acute distress.     Appearance: Normal appearance. She is well-developed.   HENT:      Head: Normocephalic and atraumatic.   Eyes:      General: No scleral icterus.     Conjunctiva/sclera: Conjunctivae normal.   Pulmonary:      Effort: Pulmonary effort is normal. No respiratory distress.   Chest:   Breasts:     Breasts are " symmetrical.      Right: Normal.      Left: Normal.   Abdominal:      General: There is no distension.      Palpations: Abdomen is soft. There is no mass.      Tenderness: There is no abdominal tenderness.   Genitourinary:     General: Normal vulva.      Exam position: Lithotomy position.      Pubic Area: No pubic lice.       Labia:         Right: No tenderness or lesion.         Left: No tenderness or lesion.       Urethra: No prolapse.      Vagina: No foreign body. No vaginal discharge, tenderness, bleeding, lesions or prolapsed vaginal walls.      Cervix: Normal.      Uterus: Not deviated, not enlarged, not fixed, not tender and no uterine prolapse.       Adnexa:         Right: No mass, tenderness or fullness.          Left: No mass, tenderness or fullness.        Rectum: No external hemorrhoid.   Musculoskeletal:      Right lower leg: No edema.      Left lower leg: No edema.   Lymphadenopathy:      Upper Body:      Right upper body: No supraclavicular, axillary or pectoral adenopathy.      Left upper body: No supraclavicular, axillary or pectoral adenopathy.   Skin:     General: Skin is warm and dry.      Coloration: Skin is not cyanotic, jaundiced or pale.   Neurological:      General: No focal deficit present.      Mental Status: She is alert and oriented to person, place, and time.   Psychiatric:         Mood and Affect: Mood normal.         Behavior: Behavior is cooperative.           Assessment & Plan   Assessment / Plan     Diagnoses and all orders for this visit:    1. Encounter for annual routine gynecological examination (Primary)    2. Screening for malignant neoplasm of cervix  -     Liquid-based Pap Smear, Screening        Discussion:   BMI Body mass index is 22.8 kg/m²..   Colonoscopy: at age 45  Mammogram: at age 40  DEXA:  at age 65  Pap smear, per ASCCP guidelines, Done today  STI screening: declines  Contraception: declines    AVS/Patient education handout on the following as well  w/discussion  Encouraged self breast awareness.  Encouraged proactive weight management and importance of maintaining a healthy weight.   Encouraged regular exercise and the importance of same, in regards to a healthy heart as well as helping to maintain her weight and improving her mental health.        Follow-up: Return in about 1 year (around 11/6/2024), or if symptoms worsen or fail to improve, for annual.    Jerry Dominguez MD

## 2023-11-09 LAB
GEN CATEG CVX/VAG CYTO-IMP: NORMAL
HPV I/H RISK 4 DNA CVX QL PROBE+SIG AMP: NOT DETECTED
LAB AP CASE REPORT: NORMAL
LAB AP GYN ADDITIONAL INFORMATION: NORMAL
Lab: NORMAL
PATH INTERP SPEC-IMP: NORMAL
STAT OF ADQ CVX/VAG CYTO-IMP: NORMAL

## 2023-11-17 ENCOUNTER — HOSPITAL ENCOUNTER (OUTPATIENT)
Dept: GENERAL RADIOLOGY | Facility: HOSPITAL | Age: 33
Discharge: HOME | End: 2023-11-17
Admitting: INTERNAL MEDICINE
Payer: COMMERCIAL

## 2023-11-17 DIAGNOSIS — M25.572 ACUTE LEFT ANKLE PAIN: ICD-10-CM

## 2023-11-17 PROCEDURE — 73610 X-RAY EXAM OF ANKLE: CPT

## 2024-03-28 ENCOUNTER — TELEMEDICINE (OUTPATIENT)
Dept: OBSTETRICS AND GYNECOLOGY | Age: 34
End: 2024-03-28
Payer: COMMERCIAL

## 2024-03-28 ENCOUNTER — TELEPHONE (OUTPATIENT)
Dept: OBSTETRICS AND GYNECOLOGY | Age: 34
End: 2024-03-28
Payer: COMMERCIAL

## 2024-03-28 VITALS — HEIGHT: 64 IN | WEIGHT: 132.72 LBS | BODY MASS INDEX: 22.66 KG/M2

## 2024-03-28 DIAGNOSIS — N94.10 FEMALE DYSPAREUNIA: ICD-10-CM

## 2024-03-28 DIAGNOSIS — N80.9 ENDOMETRIOSIS: ICD-10-CM

## 2024-03-28 DIAGNOSIS — R10.2 PELVIC PAIN: ICD-10-CM

## 2024-03-28 DIAGNOSIS — N94.6 DYSMENORRHEA: Primary | ICD-10-CM

## 2024-03-28 DIAGNOSIS — N97.9 INFERTILITY, FEMALE: ICD-10-CM

## 2024-03-28 NOTE — PROGRESS NOTES
"    Jerry Dominguez MD  [facility]  [address]  [place] [address]  Office [phone]  Fax [phone]      [facility]  Emerita Hutchins  : 1990  MRN: [id number]      Mode of Visit: Video  Location of patient: home  You have chosen to receive care through a telehealth visit.  The patient has signed the video visit consent form.  The visit included audio and video interaction. No technical issues occurred during this visit.     Subjective   Subjective     Chief Complaint   Patient presents with    Dyspareunia    Dysmenorrhea           Endometriosis              History of Present Illness  Emerita Hutchins is a 33 y.o. female , , who was seen for a Telehealth visit for follow-up.  Has been seeing BERTIN for infertility. Currently undergoing IUI. First round was stimulated with ovulation induction (letrozole and trigger shot). Second round tomorrow and this is a natural round. Still with dysparunia at least 75% of the time. Deep in the pelvis with insertion/penetration. Cycles still painful. Patient's last menstrual period was 2024 (approximate). Overall, reports symptoms have gradually been worsening. On chart review, we discussed last year possible pelvic pain evaluation with laparoscopy secondary to the above.     Review of Systems   Genitourinary:  Positive for dyspareunia, menstrual problem and pelvic pain.   All other systems reviewed and are negative.       Personal History     The following portions of the patient's history were reviewed and updated as appropriate: allergies, current medications, past family history, past medical history, past social history, past surgical history, and problem list.    Objective    Objective     Vitals:   Visit Vitals  Ht 162.6 cm (64\")   Wt 60.2 kg (132 lb 11.5 oz)   LMP 2024 (Approximate)   BMI 22.78 kg/m²    Telehealth visit    Physical Exam  Vitals reviewed.   Constitutional:       General: She is not in acute distress.     " Appearance: Normal appearance. She is not ill-appearing.   HENT:      Head: Normocephalic and atraumatic.      Nose: No congestion or rhinorrhea.   Eyes:      General: No scleral icterus.        Right eye: No discharge.         Left eye: No discharge.      Extraocular Movements: Extraocular movements intact.      Conjunctiva/sclera: Conjunctivae normal.   Pulmonary:      Effort: Pulmonary effort is normal. No accessory muscle usage or respiratory distress.   Skin:     Coloration: Skin is not ashen, cyanotic, jaundiced or pale.   Neurological:      General: No focal deficit present.      Mental Status: She is alert and oriented to person, place, and time.   Psychiatric:         Mood and Affect: Mood normal.         Behavior: Behavior is cooperative.       Result Review      Antimullerian Hormone (AMH) (02/21/2023 13:49)  = 3.2    Tissue Pathology Exam (05/10/2023 15:19)   Endometrium, biopsies:  Proliferative phase endometrium, negative for hyperplasia and atypia.     US Non-ob Transvaginal (05/10/2023 14:20)   Impression:     1.  Uterus: Normal size and Anteverted     2.  Endometrium: 8.4 mm      3.  Myometrium: Normal homogenous texture      4.  Ovaries  Left:    Normal/unremarkable  and 21 mm follicle  Right:  Normal/unremarkable     FL Hysterosalpingogram (05/10/2023 13:06)   IMPRESSION:  1. Normal appearance of the uterine cavity, without filling defect or  morphologic variation.  2. Both tubes are patent and without filling defect. Appropriate  contrast spillage into the adnexa.        Assessment & Plan   Assessment / Plan     Diagnoses and all orders for this visit:    1. Dysmenorrhea (Primary)    2. Pelvic pain    3. Infertility, female    4. Female dyspareunia    5. Endometriosis          Discussion:   Continue treatment with BERTIN for infertility. Plan is to trial possible 4-5 IUIs. If no success, the next step is IVF. She wants to consider laparoscopy for pelvic pain, dysmenorrhea, dyspareunia prior to IVF  to rule out endometriosis/treat if present. This is reasonable. As we had discussed this previously (2023), and with her worsening symptoms, I do suspect endometriosis is the culprit of her above problems. She will let us know when she wants to proceed with surgery. Questions answered. She expressed understanding.     30 minutes was spent with this patient, at least 70% of that time in direct face-to-face counseling about her condition/treatment options.        Follow-up: Return in about 2 months (around 5/28/2024), or if symptoms worsen or fail to improve, for Possible Pre-Op.    Jerry Dominguez MD

## 2024-03-28 NOTE — TELEPHONE ENCOUNTER
----- Message from Jerry Dominguez MD sent at 3/28/2024  8:47 AM CDT -----  Regarding: follow-up appointment  Let's get her scheduled in June 2024 for pre-op visit for laparoscopy.    Thanks!

## 2024-05-21 ENCOUNTER — OFFICE VISIT (OUTPATIENT)
Dept: OBSTETRICS AND GYNECOLOGY | Age: 34
End: 2024-05-21
Payer: COMMERCIAL

## 2024-05-21 VITALS
DIASTOLIC BLOOD PRESSURE: 70 MMHG | SYSTOLIC BLOOD PRESSURE: 118 MMHG | WEIGHT: 130.8 LBS | BODY MASS INDEX: 22.33 KG/M2 | HEIGHT: 64 IN

## 2024-05-21 DIAGNOSIS — N94.6 DYSMENORRHEA: ICD-10-CM

## 2024-05-21 DIAGNOSIS — N92.0 MENORRHAGIA WITH REGULAR CYCLE: ICD-10-CM

## 2024-05-21 DIAGNOSIS — N94.10 FEMALE DYSPAREUNIA: ICD-10-CM

## 2024-05-21 DIAGNOSIS — N97.9 INFERTILITY, FEMALE: ICD-10-CM

## 2024-05-21 DIAGNOSIS — K59.00 DYSCHEZIA: ICD-10-CM

## 2024-05-21 DIAGNOSIS — R10.2 PELVIC PAIN: Primary | ICD-10-CM

## 2024-05-21 NOTE — PROGRESS NOTES
"    Jerry Dominguez MD  [facility]  [address]  [place] [address]  Office [phone]  Fax [phone]      [facility]  Emerita Hutchins  : 1990  MRN: [id number]    Subjective   Subjective     Chief Complaint   Patient presents with    Pelvic Pain     Patient here for discussion of worsening periods- bleeding for longer and heavier. The pain is on the left side during menstrual and ovulation. Wanting to follow up on fertility.       History of Present Illness  Emerita Hutchins is a 33 y.o. female , , who comes to the office today for follow-up. Pelvic pain, dysmenorrhea, dysparunia, menorrhagia, dyschezia. Worsening gradually. Day 13 of cycle currently. Not stimulated cycle/ovarian induction. Talked with BERTIN at [facility] - considering possible biopsy of the endometrium for evaluation of endometritis/endometriosis instead of laparoscopy for surgical evaluation. Told that regardless of biopsy, may still lead to IVF. She is not sure about IVF at this time.  Three failed IUIs so far.      Review of Systems   Genitourinary:  Positive for dyspareunia, menstrual problem and pelvic pain. Negative for decreased urine volume, difficulty urinating, dysuria, enuresis, flank pain, frequency, genital sores, hematuria, urgency, vaginal bleeding, vaginal discharge and vaginal pain.   All other systems reviewed and are negative.       The following portions of the patient's history were reviewed and updated as appropriate: allergies, current medications, past family history, past medical history, past social history, past surgical history, and problem list.    Objective    Objective     Vitals:   Visit Vitals  /70   Ht 162.6 cm (64\")   Wt 59.3 kg (130 lb 12.8 oz)   LMP 05/10/2024 (Exact Date)   BMI 22.45 kg/m²        Physical Exam  Vitals reviewed.   Constitutional:       General: She is not in acute distress.     Appearance: Normal appearance. She is not ill-appearing.   HENT:      Head: " Normocephalic and atraumatic.      Nose: No congestion or rhinorrhea.   Eyes:      General: No scleral icterus.        Right eye: No discharge.         Left eye: No discharge.      Extraocular Movements: Extraocular movements intact.      Conjunctiva/sclera: Conjunctivae normal.   Pulmonary:      Effort: Pulmonary effort is normal. No accessory muscle usage or respiratory distress.   Musculoskeletal:      Right lower leg: No edema.      Left lower leg: No edema.   Skin:     General: Skin is warm and dry.      Coloration: Skin is not ashen, cyanotic or jaundiced.   Neurological:      General: No focal deficit present.      Mental Status: She is alert and oriented to person, place, and time.   Psychiatric:         Mood and Affect: Mood normal.         Behavior: Behavior is cooperative.         Result Review    US Non-ob Transvaginal (05/21/2024 08:36)      1.  Uterus: Normal size, with uterine dimensions 7.9 x 3.3 x 3.3 cm, and Anteverted     2.  Endometrium:  6.5 mm      3.  Myometrium: Normal homogenous texture      4.  Ovaries  Left:     dominant follicle, 20 mm; otherwise normal appearing ovary, measures 3.1 x 2.2 x 1.9 cm  Right:  Normal/unremarkable , measures 2.7 x 2.4 x 1.7 cm        Assessment & Plan   Assessment / Plan     Diagnoses and all orders for this visit:    1. Pelvic pain (Primary)    2. Infertility, female    3. Dysmenorrhea    4. Female dyspareunia    5. Dyschezia    6. Menorrhagia with regular cycle        Discussion:   Reviewed normal appearing ultrasound with dominant follicle on left ovary. Discussed that symptoms are likely worsening secondary to the extended and increased doses of her infertility medications. Recommendation would be OTC NSAIDs as well as consideration of hormonal contraceptives to suppress however, this is not ideal as she is trying to conceive. Discussed with her pain with defecation (she states moreso at time of menses), that endometriosis is still a possibility. Offered  GI referral for discussion/evaluation. She will consider. Discussed fertility with patient. Questions answered. She may consider IUI here. She will let us know. Discussed ok to take a break from fertility treatments.     30 minutes was spent with this patient, at least 70% of that time in direct face-to-face counseling about her condition/treatment options.      Follow-up: Return if symptoms worsen or fail to improve.    Jerry Dominguez MD

## 2024-07-01 PROCEDURE — 88305 TISSUE EXAM BY PATHOLOGIST: CPT | Performed by: PHYSICIAN ASSISTANT

## 2024-07-02 ENCOUNTER — LAB REQUISITION (OUTPATIENT)
Dept: LAB | Facility: HOSPITAL | Age: 34
End: 2024-07-02
Payer: COMMERCIAL

## 2024-07-02 DIAGNOSIS — D48.5 NEOPLASM OF UNCERTAIN BEHAVIOR OF SKIN: ICD-10-CM

## 2024-07-05 LAB
CYTO UR: NORMAL
LAB AP CASE REPORT: NORMAL
LAB AP CLINICAL INFORMATION: NORMAL
Lab: NORMAL
PATH REPORT.FINAL DX SPEC: NORMAL
PATH REPORT.GROSS SPEC: NORMAL

## 2024-07-29 ENCOUNTER — OFFICE VISIT (OUTPATIENT)
Age: 34
End: 2024-07-29
Payer: COMMERCIAL

## 2024-07-29 VITALS
HEIGHT: 64 IN | SYSTOLIC BLOOD PRESSURE: 112 MMHG | WEIGHT: 127 LBS | BODY MASS INDEX: 21.68 KG/M2 | DIASTOLIC BLOOD PRESSURE: 74 MMHG

## 2024-07-29 DIAGNOSIS — Z78.9 NON-SMOKER: ICD-10-CM

## 2024-07-29 DIAGNOSIS — N97.9 INFERTILITY, FEMALE: Primary | ICD-10-CM

## 2024-07-29 DIAGNOSIS — N80.9 ENDOMETRIOSIS: ICD-10-CM

## 2024-07-29 PROCEDURE — 99214 OFFICE O/P EST MOD 30 MIN: CPT | Performed by: OBSTETRICS & GYNECOLOGY

## 2024-07-29 RX ORDER — GABAPENTIN 100 MG/1
600 CAPSULE ORAL ONCE
OUTPATIENT
Start: 2024-07-29 | End: 2024-07-29

## 2024-07-29 RX ORDER — ACETAMINOPHEN 500 MG
1000 TABLET ORAL ONCE
OUTPATIENT
Start: 2024-07-29 | End: 2024-07-29

## 2024-07-29 RX ORDER — SODIUM CHLORIDE 9 MG/ML
40 INJECTION, SOLUTION INTRAVENOUS AS NEEDED
OUTPATIENT
Start: 2024-07-29

## 2024-07-29 RX ORDER — SODIUM CHLORIDE 0.9 % (FLUSH) 0.9 %
10 SYRINGE (ML) INJECTION EVERY 12 HOURS SCHEDULED
OUTPATIENT
Start: 2024-07-29

## 2024-07-29 RX ORDER — SODIUM CHLORIDE 0.9 % (FLUSH) 0.9 %
10 SYRINGE (ML) INJECTION AS NEEDED
OUTPATIENT
Start: 2024-07-29

## 2024-07-29 RX ORDER — SODIUM CHLORIDE, SODIUM LACTATE, POTASSIUM CHLORIDE, CALCIUM CHLORIDE 600; 310; 30; 20 MG/100ML; MG/100ML; MG/100ML; MG/100ML
125 INJECTION, SOLUTION INTRAVENOUS CONTINUOUS
OUTPATIENT
Start: 2024-07-29

## 2024-07-29 RX ORDER — PHENAZOPYRIDINE HYDROCHLORIDE 100 MG/1
200 TABLET, FILM COATED ORAL ONCE
OUTPATIENT
Start: 2024-07-29 | End: 2024-07-29

## 2024-07-29 RX ORDER — SCOLOPAMINE TRANSDERMAL SYSTEM 1 MG/1
1 PATCH, EXTENDED RELEASE TRANSDERMAL CONTINUOUS
OUTPATIENT
Start: 2024-07-29 | End: 2024-08-01

## 2024-07-29 NOTE — H&P (VIEW-ONLY)
"    Srini Sandoval MD  [facility]  [address]  [place] [address]  Office [phone]  Fax [phone]      [facility]  Emerita Hutchins  1990  [id number]  [id number]  2024    Subjective   Emerita Hutchins is a 33 y.o. year old female  who presents for surgery due to  endometriosis and infertility .  Failed conservative measures include N/A.    COEMIG Procedure yes  Rating of Pain 1  Effect on daily activities minimal    HPI    Risks, benefits, alternatives were all discussed with the patient at length.  Risks of bleeding and infection as well as damage to surrounding structures were discussed.  She wishes to proceed.    Past Medical History:   Diagnosis Date    Anxiety     Depression     Family history of colonic polyps     Hypothyroidism        Past Surgical History:   Procedure Laterality Date    ENDOSCOPY N/A 2023    Normal esophagus; Normal stomach; Normal examined duodenum; No specimens collected         Current Outpatient Medications:     buPROPion XL (Wellbutrin XL) 150 MG 24 hr tablet, Take 1 tablet by mouth Daily., Disp: 90 tablet, Rfl: 3    chorionic gonadotropin (Pregnyl) 19686 units injection, Inject 10,000 IU into the muscle as directed, Disp: 1 each, Rfl: 12    folic Acid-vit B6-vit B12 (Folplex 2.2) 2.2-25-0.5 MG tablet, Take 1 tablet by mouth once daily., Disp: 30 tablet, Rfl: 11    Needle, Disp, (BD Hypodermic Needle) 25G X 1-1/2\" misc, To inject HCG IM at trigger time, Disp: 1 each, Rfl: 12    prenatal vitamin tablet, Take 1 tablet by mouth Daily., Disp: , Rfl:     Syringe/Needle, Disp, (B-D 3CC LUER-BE SYR 19BE8-5/2) 18G X 1-1/2\" 3 ML misc, To draw up HCG, Disp: 1 each, Rfl: 12    No Known Allergies    Family History   Problem Relation Age of Onset    Diabetes Mother     Hypertension Mother     Hyperlipidemia Mother     Olvera's esophagus Mother     Breast cancer Mother         BRCA testing pending as of 23    Drug abuse Brother  "    Olvera's esophagus Maternal Grandmother     Dementia Maternal Grandmother     Colon polyps Maternal Grandfather         < 60 years of age    Cancer Maternal Grandfather     Esophageal cancer Maternal Grandfather     Colon cancer Neg Hx     Liver cancer Neg Hx     Liver disease Neg Hx     Rectal cancer Neg Hx     Stomach cancer Neg Hx        Social History     Socioeconomic History    Marital status:    Tobacco Use    Smoking status: Never     Passive exposure: Never    Smokeless tobacco: Never   Vaping Use    Vaping status: Never Used   Substance and Sexual Activity    Alcohol use: Yes     Comment: Occasionally    Drug use: Never    Sexual activity: Yes     Partners: Male     Birth control/protection: None       OB History    Para Term  AB Living   0 0 0 0 0 0   SAB IAB Ectopic Molar Multiple Live Births   0 0 0 0 0 0       Review of Systems   All other systems reviewed and are negative.         Objective   Vitals:    24 1007   BP: 112/74       Physical Exam  Constitutional:       General: She is not in acute distress.     Appearance: Normal appearance. She is not toxic-appearing.   HENT:      Head: Normocephalic and atraumatic.      Nose: Nose normal.   Neurological:      General: No focal deficit present.      Mental Status: She is alert.   Psychiatric:         Mood and Affect: Mood normal.         Behavior: Behavior normal.         Thought Content: Thought content normal.         Judgment: Judgment normal.            Assessment & Plan   Assessment/Plan:  Diagnoses and all orders for this visit:    1. Infertility, female (Primary)  -     Case Request  -     sodium chloride 0.9 % flush 10 mL  -     sodium chloride 0.9 % flush 10 mL  -     sodium chloride 0.9 % infusion 40 mL  -     lactated ringers infusion  -     phenazopyridine (PYRIDIUM) tablet 200 mg  -     acetaminophen (TYLENOL) tablet 1,000 mg  -     gabapentin (NEURONTIN) capsule 600 mg  -     scopolamine patch 1 mg/72 hr  -      ceFAZolin (ANCEF) 2,000 mg in sodium chloride 0.9 % 100 mL IVPB    2. Endometriosis  -     Case Request  -     sodium chloride 0.9 % flush 10 mL  -     sodium chloride 0.9 % flush 10 mL  -     sodium chloride 0.9 % infusion 40 mL  -     lactated ringers infusion  -     phenazopyridine (PYRIDIUM) tablet 200 mg  -     acetaminophen (TYLENOL) tablet 1,000 mg  -     gabapentin (NEURONTIN) capsule 600 mg  -     scopolamine patch 1 mg/72 hr  -     ceFAZolin (ANCEF) 2,000 mg in sodium chloride 0.9 % 100 mL IVPB    3. Non-smoker    Other orders  -     Follow Anesthesia Guidelines / Protocol; Future  -     Follow Anesthesia Guidelines / Protocol; Standing  -     Chlorhexidine Skin Prep; Future  -     Obtain Informed Consent; Standing  -     Verify NPO Status; Standing  -     Verify The Time Patient Completed ERAS Hydration Drink; Standing  -     Verify / Perform Chlorhexidine Skin Prep; Standing  -     Type & Screen; Standing  -     Insert Peripheral IV; Standing  -     Saline Lock & Maintain IV Access; Standing  -     Place Sequential Compression Device; Standing  -     Maintain Sequential Compression Device; Standing        The risks, benefits, and alternatives of the procedure; along with the risks of anesthesia was discussed in full with the patient and all questions were answered.      Srini Sandoval MD

## 2024-07-29 NOTE — H&P
"    Srini Sandoval MD  [facility]  [address]  [place] [address]  Office [phone]  Fax [phone]      [facility]  Emerita Hutchins  1990  [id number]  [id number]  2024    Subjective   Emerita Hutchins is a 33 y.o. year old female  who presents for surgery due to  endometriosis and infertility .  Failed conservative measures include N/A.    COEMIG Procedure yes  Rating of Pain 1  Effect on daily activities minimal    HPI    Risks, benefits, alternatives were all discussed with the patient at length.  Risks of bleeding and infection as well as damage to surrounding structures were discussed.  She wishes to proceed.    Past Medical History:   Diagnosis Date    Anxiety     Depression     Family history of colonic polyps     Hypothyroidism        Past Surgical History:   Procedure Laterality Date    ENDOSCOPY N/A 2023    Normal esophagus; Normal stomach; Normal examined duodenum; No specimens collected         Current Outpatient Medications:     buPROPion XL (Wellbutrin XL) 150 MG 24 hr tablet, Take 1 tablet by mouth Daily., Disp: 90 tablet, Rfl: 3    chorionic gonadotropin (Pregnyl) 60014 units injection, Inject 10,000 IU into the muscle as directed, Disp: 1 each, Rfl: 12    folic Acid-vit B6-vit B12 (Folplex 2.2) 2.2-25-0.5 MG tablet, Take 1 tablet by mouth once daily., Disp: 30 tablet, Rfl: 11    Needle, Disp, (BD Hypodermic Needle) 25G X 1-1/2\" misc, To inject HCG IM at trigger time, Disp: 1 each, Rfl: 12    prenatal vitamin tablet, Take 1 tablet by mouth Daily., Disp: , Rfl:     Syringe/Needle, Disp, (B-D 3CC LUER-BE SYR 01II4-3/2) 18G X 1-1/2\" 3 ML misc, To draw up HCG, Disp: 1 each, Rfl: 12    No Known Allergies    Family History   Problem Relation Age of Onset    Diabetes Mother     Hypertension Mother     Hyperlipidemia Mother     Olvera's esophagus Mother     Breast cancer Mother         BRCA testing pending as of 23    Drug abuse Brother  "    Olvera's esophagus Maternal Grandmother     Dementia Maternal Grandmother     Colon polyps Maternal Grandfather         < 60 years of age    Cancer Maternal Grandfather     Esophageal cancer Maternal Grandfather     Colon cancer Neg Hx     Liver cancer Neg Hx     Liver disease Neg Hx     Rectal cancer Neg Hx     Stomach cancer Neg Hx        Social History     Socioeconomic History    Marital status:    Tobacco Use    Smoking status: Never     Passive exposure: Never    Smokeless tobacco: Never   Vaping Use    Vaping status: Never Used   Substance and Sexual Activity    Alcohol use: Yes     Comment: Occasionally    Drug use: Never    Sexual activity: Yes     Partners: Male     Birth control/protection: None       OB History    Para Term  AB Living   0 0 0 0 0 0   SAB IAB Ectopic Molar Multiple Live Births   0 0 0 0 0 0       Review of Systems   All other systems reviewed and are negative.         Objective   Vitals:    24 1007   BP: 112/74       Physical Exam  Constitutional:       General: She is not in acute distress.     Appearance: Normal appearance. She is not toxic-appearing.   HENT:      Head: Normocephalic and atraumatic.      Nose: Nose normal.   Neurological:      General: No focal deficit present.      Mental Status: She is alert.   Psychiatric:         Mood and Affect: Mood normal.         Behavior: Behavior normal.         Thought Content: Thought content normal.         Judgment: Judgment normal.            Assessment & Plan   Assessment/Plan:  Diagnoses and all orders for this visit:    1. Infertility, female (Primary)  -     Case Request  -     sodium chloride 0.9 % flush 10 mL  -     sodium chloride 0.9 % flush 10 mL  -     sodium chloride 0.9 % infusion 40 mL  -     lactated ringers infusion  -     phenazopyridine (PYRIDIUM) tablet 200 mg  -     acetaminophen (TYLENOL) tablet 1,000 mg  -     gabapentin (NEURONTIN) capsule 600 mg  -     scopolamine patch 1 mg/72 hr  -      ceFAZolin (ANCEF) 2,000 mg in sodium chloride 0.9 % 100 mL IVPB    2. Endometriosis  -     Case Request  -     sodium chloride 0.9 % flush 10 mL  -     sodium chloride 0.9 % flush 10 mL  -     sodium chloride 0.9 % infusion 40 mL  -     lactated ringers infusion  -     phenazopyridine (PYRIDIUM) tablet 200 mg  -     acetaminophen (TYLENOL) tablet 1,000 mg  -     gabapentin (NEURONTIN) capsule 600 mg  -     scopolamine patch 1 mg/72 hr  -     ceFAZolin (ANCEF) 2,000 mg in sodium chloride 0.9 % 100 mL IVPB    3. Non-smoker    Other orders  -     Follow Anesthesia Guidelines / Protocol; Future  -     Follow Anesthesia Guidelines / Protocol; Standing  -     Chlorhexidine Skin Prep; Future  -     Obtain Informed Consent; Standing  -     Verify NPO Status; Standing  -     Verify The Time Patient Completed ERAS Hydration Drink; Standing  -     Verify / Perform Chlorhexidine Skin Prep; Standing  -     Type & Screen; Standing  -     Insert Peripheral IV; Standing  -     Saline Lock & Maintain IV Access; Standing  -     Place Sequential Compression Device; Standing  -     Maintain Sequential Compression Device; Standing        The risks, benefits, and alternatives of the procedure; along with the risks of anesthesia was discussed in full with the patient and all questions were answered.      Srini Sandoval MD

## 2024-07-29 NOTE — PROGRESS NOTES
"Chief Complaint  Follow-up (Pt here to discuss potential endometriosis, has seen Dr. Dominguez in the past for infertility and has had four failed IUIs, she had receptiva biopsy and would like to discuss excision)    Subjective        Emerita Hutchins presents to [facility]  History of Present Illness    Patient presents today to discuss endometriosis  She had an endometrial biopsy test, Receptiva that was significantly positive and suspicious for endometriosis  She has failed multiple intrauterine inseminations  Her menses are not problematic  Recent ultrasound shows no significant abnormalities  She does have occasional bloating and belly discomfort    Risks and benefits of diagnostic laparoscopy, endometriosis resection, lysis of adhesions were discussed at length.  Risk of bleeding infection and damage to surrounding organs were all reviewed.  Possible negative findings and negative results were also discussed.  She wishes to proceed.    Objective   Vital Signs:  /74 (BP Location: Left arm, Patient Position: Sitting, Cuff Size: Adult)   Ht 162.6 cm (64\")   Wt 57.6 kg (127 lb)   BMI 21.80 kg/m²   Estimated body mass index is 21.8 kg/m² as calculated from the following:    Height as of this encounter: 162.6 cm (64\").    Weight as of this encounter: 57.6 kg (127 lb).       BMI is within normal parameters. No other follow-up for BMI required.      Physical Exam  Constitutional:       General: She is not in acute distress.     Appearance: Normal appearance. She is not toxic-appearing.   HENT:      Head: Normocephalic and atraumatic.      Nose: Nose normal.   Neurological:      General: No focal deficit present.      Mental Status: She is alert.   Psychiatric:         Mood and Affect: Mood normal.         Behavior: Behavior normal.         Thought Content: Thought content normal.         Judgment: Judgment normal.        Result Review :                     Assessment and Plan "     Diagnoses and all orders for this visit:    1. Infertility, female (Primary)  -     Case Request  -     sodium chloride 0.9 % flush 10 mL  -     sodium chloride 0.9 % flush 10 mL  -     sodium chloride 0.9 % infusion 40 mL  -     lactated ringers infusion  -     phenazopyridine (PYRIDIUM) tablet 200 mg  -     acetaminophen (TYLENOL) tablet 1,000 mg  -     gabapentin (NEURONTIN) capsule 600 mg  -     scopolamine patch 1 mg/72 hr  -     ceFAZolin (ANCEF) 2,000 mg in sodium chloride 0.9 % 100 mL IVPB    2. Endometriosis  -     Case Request  -     sodium chloride 0.9 % flush 10 mL  -     sodium chloride 0.9 % flush 10 mL  -     sodium chloride 0.9 % infusion 40 mL  -     lactated ringers infusion  -     phenazopyridine (PYRIDIUM) tablet 200 mg  -     acetaminophen (TYLENOL) tablet 1,000 mg  -     gabapentin (NEURONTIN) capsule 600 mg  -     scopolamine patch 1 mg/72 hr  -     ceFAZolin (ANCEF) 2,000 mg in sodium chloride 0.9 % 100 mL IVPB    3. Non-smoker    Other orders  -     Follow Anesthesia Guidelines / Protocol; Future  -     Follow Anesthesia Guidelines / Protocol; Standing  -     Chlorhexidine Skin Prep; Future  -     Obtain Informed Consent; Standing  -     Verify NPO Status; Standing  -     Verify The Time Patient Completed ERAS Hydration Drink; Standing  -     Verify / Perform Chlorhexidine Skin Prep; Standing  -     Type & Screen; Standing  -     Insert Peripheral IV; Standing  -     Saline Lock & Maintain IV Access; Standing  -     Place Sequential Compression Device; Standing  -     Maintain Sequential Compression Device; Standing             Follow Up     No follow-ups on file.  Patient was given instructions and counseling regarding her condition or for health maintenance advice. Please see specific information pulled into the AVS if appropriate.     Srini Sandoval MD

## 2024-07-31 ENCOUNTER — PRE-ADMISSION TESTING (OUTPATIENT)
Dept: PREADMISSION TESTING | Facility: HOSPITAL | Age: 34
End: 2024-07-31
Payer: COMMERCIAL

## 2024-07-31 VITALS
SYSTOLIC BLOOD PRESSURE: 117 MMHG | WEIGHT: 129.85 LBS | BODY MASS INDEX: 21.63 KG/M2 | RESPIRATION RATE: 16 BRPM | DIASTOLIC BLOOD PRESSURE: 62 MMHG | OXYGEN SATURATION: 100 % | HEART RATE: 75 BPM | HEIGHT: 65 IN

## 2024-07-31 LAB
DEPRECATED RDW RBC AUTO: 37.8 FL (ref 37–54)
ERYTHROCYTE [DISTWIDTH] IN BLOOD BY AUTOMATED COUNT: 11.8 % (ref 12.3–15.4)
HCT VFR BLD AUTO: 39.8 % (ref 34–46.6)
HGB BLD-MCNC: 13.1 G/DL (ref 12–15.9)
MCH RBC QN AUTO: 28.7 PG (ref 26.6–33)
MCHC RBC AUTO-ENTMCNC: 32.9 G/DL (ref 31.5–35.7)
MCV RBC AUTO: 87.3 FL (ref 79–97)
PLATELET # BLD AUTO: 288 10*3/MM3 (ref 140–450)
PMV BLD AUTO: 9.1 FL (ref 6–12)
RBC # BLD AUTO: 4.56 10*6/MM3 (ref 3.77–5.28)
WBC NRBC COR # BLD AUTO: 3.93 10*3/MM3 (ref 3.4–10.8)

## 2024-07-31 PROCEDURE — 85027 COMPLETE CBC AUTOMATED: CPT

## 2024-07-31 PROCEDURE — 36415 COLL VENOUS BLD VENIPUNCTURE: CPT

## 2024-07-31 NOTE — DISCHARGE INSTRUCTIONS
Preparing for Surgery  Follow these instructions before the procedure:  Several days or weeks before your procedure  Ask your health care provider about:  Changing or stopping your regular medicines. This is especially important if you are taking diabetes medicines or blood thinners.  Taking medicines such as aspirin and ibuprofen. These medicines can thin your blood. Do not take these medicines unless your health care provider tells you to take them.  Taking over-the-counter medicines, vitamins, herbs, and supplements.    Contact your surgeon if you:  Develop a fever of more than 100.4°F (38°C) or other feelings of illness during the 48 hours before your surgery.  Have symptoms that get worse.  Have questions or concerns about your surgery.  If you are going home the same day of your surgery you will need to arrange for a responsible adult, age 18 years old or older, to drive you home from the hospital and stay with you for 24 hours. Verification of the  will be made prior to any procedure requiring sedation. You may not go home in a taxi or any form of public transportation by yourself.     Day before your procedure    24 hours before your procedure DO NOT drink alcoholic beverages or smoke.  24 hours before your procedure STOP taking Erectile Dysfunction medication (i.e.,Cialis, Viagra)   You may be asked to shower with a germ-killing soap.  Day of your procedure   You may take the following medication(s) the morning of surgery with a sip of water: AS DIRECTED BY       8 hours before your procedure STOP all food, any dairy products, and full liquids. This includes hard candy, chewing gum or mints. This is extremely important to prevent serious complications.     Up to 2 hours before your scheduled arrival time, you may have clear liquids no cream, powder, or pulp of any kind. Safe options are water, black coffee, plain tea, soda, Gatorade/Powerade, clear broth, apple juice.    2 hours before your  scheduled arrival time, STOP drinking clear liquids.    You may need to take another shower with a germ-killing soap before you leave home in the morning. Do not use perfumes, colognes, or body lotions.  Wear comfortable loose-fitting clothing.  Remove all jewelry including body piercing and rings, dark colored nail polish, and make up prior to arrival at the hospital. Leave all valuables at home.   Bring your hearing aids if you rely on them.  Do not wear contact lenses. If you wear eyeglasses remember to bring a case to store them in while you are in surgery.  Do not use denture adhesives since you will be asked to remove them during your surgery.    You do not need to bring your home medications into the hospital.   Bring your sleep apnea device with you on the day of your surgery (if this applies to you).  If you wear portable oxygen, bring it with you.   If you are staying overnight, you may bring a bag of items you may need such as slippers, robe and a change of clothes for your discharge. You may want to leave these items in the car until you are ready for them since your family will take your belongings when you leave the pre-operative area.  Arrive at the hospital as scheduled by the office. You will be asked to arrive 2 hours prior to your surgery time in order to prepare for your procedure.  When you arrive at the hospital  Go to the registration desk located at the main entrance of the hospital.  After registration is completed, you will be given a beeper and a sticker sheet. Take the stickers to Outpatient Surgery and place in the tray at the end of the desk to notify the staff that you have arrived and registered.   Return to the lobby to wait. You are not always called back according to the time of arrival but rather the time your doctor will be ready.  When your beeper lights up and vibrates proceed through the double doors, under the stairs, and a member of the Outpatient Surgery staff will escort  you to your preoperative room.       How to Use Chlorhexidine Before Surgery  Chlorhexidine gluconate (CHG) is a germ-killing (antiseptic) solution that is used to clean the skin. It can get rid of the bacteria that normally live on the skin and can keep them away for about 24 hours. To clean your skin with CHG, you may be given:  A CHG solution to use in the shower or as part of a sponge bath.  A prepackaged cloth that contains CHG.  Cleaning your skin with CHG may help lower the risk for infection:  While you are staying in the intensive care unit of the hospital.  If you have a vascular access, such as a central line, to provide short-term or long-term access to your veins.  If you have a catheter to drain urine from your bladder.  If you are on a ventilator. A ventilator is a machine that helps you breathe by moving air in and out of your lungs.  After surgery.  What are the risks?  Risks of using CHG include:  A skin reaction.  Hearing loss, if CHG gets in your ears and you have a perforated eardrum.  Eye injury, if CHG gets in your eyes and is not rinsed out.  The CHG product catching fire.  Make sure that you avoid smoking and flames after applying CHG to your skin.  Do not use CHG:  If you have a chlorhexidine allergy or have previously reacted to chlorhexidine.  On babies younger than 2 months of age.  How to use CHG solution  Use CHG only as told by your health care provider, and follow the instructions on the label.  Use the full amount of CHG as directed. Usually, this is one bottle.  During a shower    Follow these steps when using CHG solution during a shower (unless your health care provider gives you different instructions):  Start the shower.  Use your normal soap and shampoo to wash your face and hair.  Turn off the shower or move out of the shower stream.  Pour the CHG onto a clean washcloth. Do not use any type of brush or rough-edged sponge.  Starting at your neck, lather your body down to your  toes. Make sure you follow these instructions:  If you will be having surgery, pay special attention to the part of your body where you will be having surgery. Scrub this area for at least 1 minute.  Do not use CHG on your head or face. If the solution gets into your ears or eyes, rinse them well with water.  Avoid your genital area.  Avoid any areas of skin that have broken skin, cuts, or scrapes.  Scrub your back and under your arms. Make sure to wash skin folds.  Let the lather sit on your skin for 1-2 minutes or as long as told by your health care provider.  Thoroughly rinse your entire body in the shower. Make sure that all body creases and crevices are rinsed well.  Dry off with a clean towel. Do not put any substances on your body afterward--such as powder, lotion, or perfume--unless you are told to do so by your health care provider. Only use lotions that are recommended by the .  Put on clean clothes or pajamas.  If it is the night before your surgery, sleep in clean sheets.     During a sponge bath  Follow these steps when using CHG solution during a sponge bath (unless your health care provider gives you different instructions):  Use your normal soap and shampoo to wash your face and hair.  Pour the CHG onto a clean washcloth.  Starting at your neck, lather your body down to your toes. Make sure you follow these instructions:  If you will be having surgery, pay special attention to the part of your body where you will be having surgery. Scrub this area for at least 1 minute.  Do not use CHG on your head or face. If the solution gets into your ears or eyes, rinse them well with water.  Avoid your genital area.  Avoid any areas of skin that have broken skin, cuts, or scrapes.  Scrub your back and under your arms. Make sure to wash skin folds.  Let the lather sit on your skin for 1-2 minutes or as long as told by your health care provider.  Using a different clean, wet washcloth, thoroughly rinse  your entire body. Make sure that all body creases and crevices are rinsed well.  Dry off with a clean towel. Do not put any substances on your body afterward--such as powder, lotion, or perfume--unless you are told to do so by your health care provider. Only use lotions that are recommended by the .  Put on clean clothes or pajamas.  If it is the night before your surgery, sleep in clean sheets.  How to use CHG prepackaged cloths  Only use CHG cloths as told by your health care provider, and follow the instructions on the label.  Use the CHG cloth on clean, dry skin.  Do not use the CHG cloth on your head or face unless your health care provider tells you to.  When washing with the CHG cloth:  Avoid your genital area.  Avoid any areas of skin that have broken skin, cuts, or scrapes.  Before surgery    Follow these steps when using a CHG cloth to clean before surgery (unless your health care provider gives you different instructions):  Using the CHG cloth, vigorously scrub the part of your body where you will be having surgery. Scrub using a back-and-forth motion for 3 minutes. The area on your body should be completely wet with CHG when you are done scrubbing.  Do not rinse. Discard the cloth and let the area air-dry. Do not put any substances on the area afterward, such as powder, lotion, or perfume.  Put on clean clothes or pajamas.  If it is the night before your surgery, sleep in clean sheets.     For general bathing  Follow these steps when using CHG cloths for general bathing (unless your health care provider gives you different instructions).  Use a separate CHG cloth for each area of your body. Make sure you wash between any folds of skin and between your fingers and toes. Wash your body in the following order, switching to a new cloth after each step:  The front of your neck, shoulders, and chest.  Both of your arms, under your arms, and your hands.  Your stomach and groin area, avoiding the  genitals.  Your right leg and foot.  Your left leg and foot.  The back of your neck, your back, and your buttocks.  Do not rinse. Discard the cloth and let the area air-dry. Do not put any substances on your body afterward--such as powder, lotion, or perfume--unless you are told to do so by your health care provider. Only use lotions that are recommended by the .  Put on clean clothes or pajamas.  Contact a health care provider if:  Your skin gets irritated after scrubbing.  You have questions about using your solution or cloth.  You swallow any chlorhexidine. Call your local poison control center (1-345.986.3314 in the [place]  Get help right away if:  Your eyes itch badly, or they become very red or swollen.  Your skin itches badly and is red or swollen.  Your hearing changes.  You have trouble seeing.  You have swelling or tingling in your mouth or throat.  You have trouble breathing.  These symptoms may represent a serious problem that is an emergency. Do not wait to see if the symptoms will go away. Get medical help right away. Call your local emergency services (082 in the [place] Do not drive yourself to the hospital.  Summary  Chlorhexidine gluconate (CHG) is a germ-killing (antiseptic) solution that is used to clean the skin. Cleaning your skin with CHG may help to lower your risk for infection.  You may be given CHG to use for bathing. It may be in a bottle or in a prepackaged cloth to use on your skin. Carefully follow your health care provider's instructions and the instructions on the product label.  Do not use CHG if you have a chlorhexidine allergy.  Contact your health care provider if your skin gets irritated after scrubbing.  This information is not intended to replace advice given to you by your health care provider. Make sure you discuss any questions you have with your health care provider.  Document Revised: 04/17/2023 Document Reviewed: 02/28/2022  Elsevier Patient Education © 2023  [employer]

## 2024-08-09 ENCOUNTER — HOSPITAL ENCOUNTER (OUTPATIENT)
Facility: HOSPITAL | Age: 34
Setting detail: HOSPITAL OUTPATIENT SURGERY
Discharge: HOME | End: 2024-08-09
Attending: OBSTETRICS & GYNECOLOGY | Admitting: OBSTETRICS & GYNECOLOGY
Payer: COMMERCIAL

## 2024-08-09 ENCOUNTER — ANESTHESIA (OUTPATIENT)
Dept: PERIOP | Facility: HOSPITAL | Age: 34
End: 2024-08-09
Payer: COMMERCIAL

## 2024-08-09 ENCOUNTER — ANESTHESIA EVENT (OUTPATIENT)
Dept: PERIOP | Facility: HOSPITAL | Age: 34
End: 2024-08-09
Payer: COMMERCIAL

## 2024-08-09 VITALS
OXYGEN SATURATION: 100 % | SYSTOLIC BLOOD PRESSURE: 119 MMHG | DIASTOLIC BLOOD PRESSURE: 70 MMHG | HEART RATE: 68 BPM | TEMPERATURE: 97.2 F | RESPIRATION RATE: 16 BRPM

## 2024-08-09 DIAGNOSIS — N97.9 INFERTILITY, FEMALE: ICD-10-CM

## 2024-08-09 DIAGNOSIS — G89.18 POSTOPERATIVE PAIN: Primary | ICD-10-CM

## 2024-08-09 DIAGNOSIS — N80.9 ENDOMETRIOSIS: ICD-10-CM

## 2024-08-09 LAB
ABO GROUP BLD: NORMAL
B-HCG UR QL: NEGATIVE
BLD GP AB SCN SERPL QL: NEGATIVE
RH BLD: POSITIVE
T&S EXPIRATION DATE: NORMAL

## 2024-08-09 PROCEDURE — 58662 LAPAROSCOPY EXCISE LESIONS: CPT | Performed by: OBSTETRICS & GYNECOLOGY

## 2024-08-09 PROCEDURE — 88305 TISSUE EXAM BY PATHOLOGIST: CPT | Performed by: OBSTETRICS & GYNECOLOGY

## 2024-08-09 PROCEDURE — 86901 BLOOD TYPING SEROLOGIC RH(D): CPT | Performed by: OBSTETRICS & GYNECOLOGY

## 2024-08-09 PROCEDURE — 25010000002 DEXAMETHASONE PER 1 MG

## 2024-08-09 PROCEDURE — 25010000002 MIDAZOLAM PER 1 MG: Performed by: ANESTHESIOLOGY

## 2024-08-09 PROCEDURE — 25010000002 FENTANYL CITRATE (PF) 100 MCG/2ML SOLUTION

## 2024-08-09 PROCEDURE — 81025 URINE PREGNANCY TEST: CPT | Performed by: OBSTETRICS & GYNECOLOGY

## 2024-08-09 PROCEDURE — 25010000002 ONDANSETRON PER 1 MG

## 2024-08-09 PROCEDURE — 25010000002 FENTANYL CITRATE (PF) 50 MCG/ML SOLUTION: Performed by: ANESTHESIOLOGY

## 2024-08-09 PROCEDURE — 86900 BLOOD TYPING SEROLOGIC ABO: CPT | Performed by: OBSTETRICS & GYNECOLOGY

## 2024-08-09 PROCEDURE — 25010000002 DEXAMETHASONE PER 1 MG: Performed by: ANESTHESIOLOGY

## 2024-08-09 PROCEDURE — 25810000003 SODIUM CHLORIDE PER 500 ML: Performed by: OBSTETRICS & GYNECOLOGY

## 2024-08-09 PROCEDURE — 25010000002 CEFAZOLIN PER 500 MG: Performed by: OBSTETRICS & GYNECOLOGY

## 2024-08-09 PROCEDURE — 25010000002 SUGAMMADEX 200 MG/2ML SOLUTION

## 2024-08-09 PROCEDURE — 25010000002 PROPOFOL 10 MG/ML EMULSION

## 2024-08-09 PROCEDURE — 25810000003 LACTATED RINGERS PER 1000 ML: Performed by: OBSTETRICS & GYNECOLOGY

## 2024-08-09 PROCEDURE — 86850 RBC ANTIBODY SCREEN: CPT | Performed by: OBSTETRICS & GYNECOLOGY

## 2024-08-09 DEVICE — HEMOST ABS SURGICEL PWDR 3GM: Type: IMPLANTABLE DEVICE | Site: ABDOMEN | Status: FUNCTIONAL

## 2024-08-09 RX ORDER — DROPERIDOL 2.5 MG/ML
0.62 INJECTION, SOLUTION INTRAMUSCULAR; INTRAVENOUS ONCE AS NEEDED
Status: DISCONTINUED | OUTPATIENT
Start: 2024-08-09 | End: 2024-08-09 | Stop reason: HOSPADM

## 2024-08-09 RX ORDER — PHENAZOPYRIDINE HYDROCHLORIDE 200 MG/1
200 TABLET, FILM COATED ORAL ONCE
Status: COMPLETED | OUTPATIENT
Start: 2024-08-09 | End: 2024-08-09

## 2024-08-09 RX ORDER — SODIUM CHLORIDE 9 MG/ML
40 INJECTION, SOLUTION INTRAVENOUS AS NEEDED
Status: DISCONTINUED | OUTPATIENT
Start: 2024-08-09 | End: 2024-08-09 | Stop reason: HOSPADM

## 2024-08-09 RX ORDER — MAGNESIUM HYDROXIDE 1200 MG/15ML
LIQUID ORAL AS NEEDED
Status: DISCONTINUED | OUTPATIENT
Start: 2024-08-09 | End: 2024-08-09 | Stop reason: HOSPADM

## 2024-08-09 RX ORDER — HYDROCODONE BITARTRATE AND ACETAMINOPHEN 5; 325 MG/1; MG/1
1 TABLET ORAL EVERY 4 HOURS PRN
Status: DISCONTINUED | OUTPATIENT
Start: 2024-08-09 | End: 2024-08-09 | Stop reason: HOSPADM

## 2024-08-09 RX ORDER — SODIUM CHLORIDE 9 MG/ML
INJECTION, SOLUTION INTRAVENOUS AS NEEDED
Status: DISCONTINUED | OUTPATIENT
Start: 2024-08-09 | End: 2024-08-09 | Stop reason: HOSPADM

## 2024-08-09 RX ORDER — SODIUM CHLORIDE 0.9 % (FLUSH) 0.9 %
10 SYRINGE (ML) INJECTION EVERY 12 HOURS SCHEDULED
Status: DISCONTINUED | OUTPATIENT
Start: 2024-08-09 | End: 2024-08-09 | Stop reason: HOSPADM

## 2024-08-09 RX ORDER — MIDAZOLAM HYDROCHLORIDE 1 MG/ML
2 INJECTION INTRAMUSCULAR; INTRAVENOUS ONCE
Status: COMPLETED | OUTPATIENT
Start: 2024-08-09 | End: 2024-08-09

## 2024-08-09 RX ORDER — FENTANYL CITRATE 50 UG/ML
25 INJECTION, SOLUTION INTRAMUSCULAR; INTRAVENOUS
Status: DISCONTINUED | OUTPATIENT
Start: 2024-08-09 | End: 2024-08-09 | Stop reason: HOSPADM

## 2024-08-09 RX ORDER — SODIUM CHLORIDE, SODIUM LACTATE, POTASSIUM CHLORIDE, CALCIUM CHLORIDE 600; 310; 30; 20 MG/100ML; MG/100ML; MG/100ML; MG/100ML
1000 INJECTION, SOLUTION INTRAVENOUS CONTINUOUS
Status: DISCONTINUED | OUTPATIENT
Start: 2024-08-09 | End: 2024-08-09 | Stop reason: HOSPADM

## 2024-08-09 RX ORDER — NALOXONE HCL 0.4 MG/ML
0.4 VIAL (ML) INJECTION AS NEEDED
Status: DISCONTINUED | OUTPATIENT
Start: 2024-08-09 | End: 2024-08-09 | Stop reason: HOSPADM

## 2024-08-09 RX ORDER — LABETALOL HYDROCHLORIDE 5 MG/ML
5 INJECTION, SOLUTION INTRAVENOUS
Status: DISCONTINUED | OUTPATIENT
Start: 2024-08-09 | End: 2024-08-09 | Stop reason: HOSPADM

## 2024-08-09 RX ORDER — ROCURONIUM BROMIDE 10 MG/ML
INJECTION, SOLUTION INTRAVENOUS AS NEEDED
Status: DISCONTINUED | OUTPATIENT
Start: 2024-08-09 | End: 2024-08-09 | Stop reason: SURG

## 2024-08-09 RX ORDER — DEXTROSE MONOHYDRATE 25 G/50ML
12.5 INJECTION, SOLUTION INTRAVENOUS AS NEEDED
Status: DISCONTINUED | OUTPATIENT
Start: 2024-08-09 | End: 2024-08-09 | Stop reason: HOSPADM

## 2024-08-09 RX ORDER — FENTANYL CITRATE 50 UG/ML
INJECTION, SOLUTION INTRAMUSCULAR; INTRAVENOUS AS NEEDED
Status: DISCONTINUED | OUTPATIENT
Start: 2024-08-09 | End: 2024-08-09 | Stop reason: SURG

## 2024-08-09 RX ORDER — PROMETHAZINE HYDROCHLORIDE 25 MG/1
12.5 TABLET ORAL ONCE AS NEEDED
Status: DISCONTINUED | OUTPATIENT
Start: 2024-08-09 | End: 2024-08-09 | Stop reason: HOSPADM

## 2024-08-09 RX ORDER — ONDANSETRON 2 MG/ML
4 INJECTION INTRAMUSCULAR; INTRAVENOUS ONCE AS NEEDED
Status: DISCONTINUED | OUTPATIENT
Start: 2024-08-09 | End: 2024-08-09 | Stop reason: HOSPADM

## 2024-08-09 RX ORDER — SCOLOPAMINE TRANSDERMAL SYSTEM 1 MG/1
1 PATCH, EXTENDED RELEASE TRANSDERMAL CONTINUOUS
Status: DISCONTINUED | OUTPATIENT
Start: 2024-08-09 | End: 2024-08-09 | Stop reason: HOSPADM

## 2024-08-09 RX ORDER — HYDROCODONE BITARTRATE AND ACETAMINOPHEN 5; 325 MG/1; MG/1
1 TABLET ORAL EVERY 8 HOURS PRN
Qty: 6 TABLET | Refills: 0 | Status: SHIPPED | OUTPATIENT
Start: 2024-08-09

## 2024-08-09 RX ORDER — SODIUM CHLORIDE 0.9 % (FLUSH) 0.9 %
10 SYRINGE (ML) INJECTION AS NEEDED
Status: DISCONTINUED | OUTPATIENT
Start: 2024-08-09 | End: 2024-08-09 | Stop reason: HOSPADM

## 2024-08-09 RX ORDER — IBUPROFEN 600 MG/1
600 TABLET ORAL EVERY 6 HOURS PRN
Status: DISCONTINUED | OUTPATIENT
Start: 2024-08-09 | End: 2024-08-09 | Stop reason: HOSPADM

## 2024-08-09 RX ORDER — ACETAMINOPHEN 500 MG
1000 TABLET ORAL ONCE
Status: COMPLETED | OUTPATIENT
Start: 2024-08-09 | End: 2024-08-09

## 2024-08-09 RX ORDER — FENTANYL CITRATE 50 UG/ML
50 INJECTION, SOLUTION INTRAMUSCULAR; INTRAVENOUS
Status: COMPLETED | OUTPATIENT
Start: 2024-08-09 | End: 2024-08-09

## 2024-08-09 RX ORDER — FLUMAZENIL 0.1 MG/ML
0.2 INJECTION INTRAVENOUS AS NEEDED
Status: DISCONTINUED | OUTPATIENT
Start: 2024-08-09 | End: 2024-08-09 | Stop reason: HOSPADM

## 2024-08-09 RX ORDER — GABAPENTIN 300 MG/1
600 CAPSULE ORAL ONCE
Status: COMPLETED | OUTPATIENT
Start: 2024-08-09 | End: 2024-08-09

## 2024-08-09 RX ORDER — SCOLOPAMINE TRANSDERMAL SYSTEM 1 MG/1
1 PATCH, EXTENDED RELEASE TRANSDERMAL ONCE
Status: DISCONTINUED | OUTPATIENT
Start: 2024-08-09 | End: 2024-08-09 | Stop reason: HOSPADM

## 2024-08-09 RX ORDER — MIDAZOLAM HYDROCHLORIDE 1 MG/ML
1 INJECTION INTRAMUSCULAR; INTRAVENOUS
Status: DISCONTINUED | OUTPATIENT
Start: 2024-08-09 | End: 2024-08-09 | Stop reason: HOSPADM

## 2024-08-09 RX ORDER — PROPOFOL 10 MG/ML
VIAL (ML) INTRAVENOUS AS NEEDED
Status: DISCONTINUED | OUTPATIENT
Start: 2024-08-09 | End: 2024-08-09 | Stop reason: SURG

## 2024-08-09 RX ORDER — ONDANSETRON 2 MG/ML
INJECTION INTRAMUSCULAR; INTRAVENOUS AS NEEDED
Status: DISCONTINUED | OUTPATIENT
Start: 2024-08-09 | End: 2024-08-09 | Stop reason: SURG

## 2024-08-09 RX ORDER — SODIUM CHLORIDE, SODIUM LACTATE, POTASSIUM CHLORIDE, CALCIUM CHLORIDE 600; 310; 30; 20 MG/100ML; MG/100ML; MG/100ML; MG/100ML
9 INJECTION, SOLUTION INTRAVENOUS CONTINUOUS
Status: DISCONTINUED | OUTPATIENT
Start: 2024-08-09 | End: 2024-08-09 | Stop reason: HOSPADM

## 2024-08-09 RX ORDER — LIDOCAINE HYDROCHLORIDE 20 MG/ML
INJECTION, SOLUTION EPIDURAL; INFILTRATION; INTRACAUDAL; PERINEURAL AS NEEDED
Status: DISCONTINUED | OUTPATIENT
Start: 2024-08-09 | End: 2024-08-09 | Stop reason: SURG

## 2024-08-09 RX ORDER — HYDROCODONE BITARTRATE AND ACETAMINOPHEN 10; 325 MG/1; MG/1
1 TABLET ORAL EVERY 4 HOURS PRN
Status: DISCONTINUED | OUTPATIENT
Start: 2024-08-09 | End: 2024-08-09 | Stop reason: HOSPADM

## 2024-08-09 RX ORDER — DEXAMETHASONE SODIUM PHOSPHATE 4 MG/ML
INJECTION, SOLUTION INTRA-ARTICULAR; INTRALESIONAL; INTRAMUSCULAR; INTRAVENOUS; SOFT TISSUE AS NEEDED
Status: DISCONTINUED | OUTPATIENT
Start: 2024-08-09 | End: 2024-08-09 | Stop reason: SURG

## 2024-08-09 RX ORDER — SODIUM CHLORIDE, SODIUM LACTATE, POTASSIUM CHLORIDE, CALCIUM CHLORIDE 600; 310; 30; 20 MG/100ML; MG/100ML; MG/100ML; MG/100ML
125 INJECTION, SOLUTION INTRAVENOUS CONTINUOUS
Status: DISCONTINUED | OUTPATIENT
Start: 2024-08-09 | End: 2024-08-09 | Stop reason: HOSPADM

## 2024-08-09 RX ORDER — DEXAMETHASONE SODIUM PHOSPHATE 4 MG/ML
4 INJECTION, SOLUTION INTRA-ARTICULAR; INTRALESIONAL; INTRAMUSCULAR; INTRAVENOUS; SOFT TISSUE ONCE AS NEEDED
Status: COMPLETED | OUTPATIENT
Start: 2024-08-09 | End: 2024-08-09

## 2024-08-09 RX ORDER — EPHEDRINE SULFATE 50 MG/ML
INJECTION, SOLUTION INTRAVENOUS AS NEEDED
Status: DISCONTINUED | OUTPATIENT
Start: 2024-08-09 | End: 2024-08-09 | Stop reason: SURG

## 2024-08-09 RX ADMIN — SODIUM CHLORIDE, POTASSIUM CHLORIDE, SODIUM LACTATE AND CALCIUM CHLORIDE 125 ML/HR: 600; 310; 30; 20 INJECTION, SOLUTION INTRAVENOUS at 12:10

## 2024-08-09 RX ADMIN — DEXAMETHASONE SODIUM PHOSPHATE 4 MG: 4 INJECTION INTRA-ARTICULAR; INTRALESIONAL; INTRAMUSCULAR; INTRAVENOUS; SOFT TISSUE at 14:21

## 2024-08-09 RX ADMIN — PROPOFOL 200 MG: 10 INJECTION, EMULSION INTRAVENOUS at 14:10

## 2024-08-09 RX ADMIN — CEFAZOLIN 2000 MG: 2 INJECTION, POWDER, FOR SOLUTION INTRAMUSCULAR; INTRAVENOUS at 14:16

## 2024-08-09 RX ADMIN — PHENAZOPYRIDINE HYDROCHLORIDE 200 MG: 200 TABLET ORAL at 12:04

## 2024-08-09 RX ADMIN — FENTANYL CITRATE 100 MCG: 50 INJECTION, SOLUTION INTRAMUSCULAR; INTRAVENOUS at 14:10

## 2024-08-09 RX ADMIN — HYDROCODONE BITARTRATE AND ACETAMINOPHEN 1 TABLET: 10; 325 TABLET ORAL at 15:58

## 2024-08-09 RX ADMIN — FENTANYL CITRATE 50 MCG: 50 INJECTION, SOLUTION INTRAMUSCULAR; INTRAVENOUS at 16:00

## 2024-08-09 RX ADMIN — LIDOCAINE HYDROCHLORIDE 100 MG: 20 INJECTION, SOLUTION EPIDURAL; INFILTRATION; INTRACAUDAL; PERINEURAL at 14:10

## 2024-08-09 RX ADMIN — SODIUM CHLORIDE, POTASSIUM CHLORIDE, SODIUM LACTATE AND CALCIUM CHLORIDE 1000 ML: 600; 310; 30; 20 INJECTION, SOLUTION INTRAVENOUS at 12:11

## 2024-08-09 RX ADMIN — ONDANSETRON 4 MG: 2 INJECTION INTRAMUSCULAR; INTRAVENOUS at 14:54

## 2024-08-09 RX ADMIN — EPHEDRINE SULFATE 10 MG: 50 INJECTION INTRAVENOUS at 14:27

## 2024-08-09 RX ADMIN — IBUPROFEN 600 MG: 600 TABLET, FILM COATED ORAL at 16:54

## 2024-08-09 RX ADMIN — ACETAMINOPHEN 1000 MG: 500 TABLET, FILM COATED ORAL at 12:04

## 2024-08-09 RX ADMIN — DEXAMETHASONE SODIUM PHOSPHATE 4 MG: 4 INJECTION, SOLUTION INTRA-ARTICULAR; INTRALESIONAL; INTRAMUSCULAR; INTRAVENOUS; SOFT TISSUE at 13:40

## 2024-08-09 RX ADMIN — FENTANYL CITRATE 50 MCG: 50 INJECTION, SOLUTION INTRAMUSCULAR; INTRAVENOUS at 14:49

## 2024-08-09 RX ADMIN — FENTANYL CITRATE 50 MCG: 50 INJECTION, SOLUTION INTRAMUSCULAR; INTRAVENOUS at 16:12

## 2024-08-09 RX ADMIN — FENTANYL CITRATE 50 MCG: 50 INJECTION, SOLUTION INTRAMUSCULAR; INTRAVENOUS at 14:53

## 2024-08-09 RX ADMIN — SCOPALAMINE 1 PATCH: 1 PATCH, EXTENDED RELEASE TRANSDERMAL at 13:40

## 2024-08-09 RX ADMIN — ROCURONIUM BROMIDE 50 MG: 10 INJECTION, SOLUTION INTRAVENOUS at 14:10

## 2024-08-09 RX ADMIN — SUGAMMADEX 200 MG: 100 INJECTION, SOLUTION INTRAVENOUS at 15:21

## 2024-08-09 RX ADMIN — MIDAZOLAM HYDROCHLORIDE 2 MG: 1 INJECTION, SOLUTION INTRAMUSCULAR; INTRAVENOUS at 13:40

## 2024-08-09 RX ADMIN — ROCURONIUM BROMIDE 20 MG: 10 INJECTION, SOLUTION INTRAVENOUS at 15:11

## 2024-08-09 RX ADMIN — GABAPENTIN 600 MG: 300 CAPSULE ORAL at 12:04

## 2024-08-09 NOTE — ANESTHESIA PREPROCEDURE EVALUATION
Anesthesia Evaluation     Patient summary reviewed   NPO Solid Status: > 8 hours             Airway   Mallampati: II  Dental      Pulmonary    (-) COPD, asthma, sleep apnea, not a smoker  Cardiovascular   Exercise tolerance: excellent (>7 METS)    (-) pacemaker, past MI, angina, cardiac stents      Neuro/Psych  (-) seizures, TIA, CVA  GI/Hepatic/Renal/Endo    (-) GERD, liver disease, no renal disease, diabetes    Musculoskeletal     Abdominal    Substance History      OB/GYN    (-)  Pregnant        Other                    Anesthesia Plan    ASA 1     general     intravenous induction     Anesthetic plan, risks, benefits, and alternatives have been provided, discussed and informed consent has been obtained with: patient.    CODE STATUS:

## 2024-08-09 NOTE — ANESTHESIA POSTPROCEDURE EVALUATION
Patient: Emerita Hutchins    Procedure Summary       Date: 08/09/24 Room / Location: [facility] OR  /  [facility] OR    Anesthesia Start: 1406 Anesthesia Stop: 1537    Procedure: DIAGNOSTIC LAPAROSCOPY WITH DAVINCI ROBOT, resection of endometriosis, lysis of adhesions (Abdomen) Diagnosis:       Infertility, female      Endometriosis      (Infertility, female [N97.9])      (Endometriosis [N80.9])    Surgeons: Srini Sandoval MD Provider: Gopal Perdue CRNA    Anesthesia Type: general ASA Status: 1            Anesthesia Type: general    Vitals  Vitals Value Taken Time   /72 08/09/24 1555   Temp 97.2 °F (36.2 °C) 08/09/24 1538   Pulse 92 08/09/24 1558   Resp 16 08/09/24 1550   SpO2 99 % 08/09/24 1558   Vitals shown include unfiled device data.        Post Anesthesia Care and Evaluation    Patient location during evaluation: PACU  Patient participation: complete - patient participated  Level of consciousness: awake and alert  Pain management: adequate    Airway patency: patent  Anesthetic complications: No anesthetic complications    Cardiovascular status: acceptable  Respiratory status: acceptable  Hydration status: acceptable    Comments: Blood pressure 110/63, pulse 58, temperature 97.2 °F (36.2 °C), temperature source Temporal, resp. rate 16, last menstrual period 07/31/2024, SpO2 100%, not currently breastfeeding.    Pt discharged from PACU based on isaiah score >8

## 2024-08-09 NOTE — OP NOTE
Srini Sandoval MD  [facility]  [address]  [place] [address]  Office [phone]  Fax [phone]      [facility]    Emerita Hutchins  [id number]  [id number]  8/9/2024        Da Josefina Procedure Note      Preoperative Diagnosis: Endometriosis    Postoperative Diagnosis: Endometriosis    Operation:  Da Josefina assisted laparoscopic lysis of adhesions, resection of endometriosis, ablation of endometriosis    Surgeon: JOE Sandoval MD, FACOG    Assistants:   Assistant: Ken Dailey  was responsible for performing the following activities: Retraction, Suction, and Irrigation and their skilled assistance was necessary for the success of this case.    Anesthesia: General endotracheal anesthesia    Findings:  Normal uterus and fallopian tubes.  Right ovary normal with a small lesion consistent with endometriosis.  Left ovary with several superficial areas consistent with endometriosis.  No cyst noted on either ovary.  Peritoneum from the pelvic brim on the left down to the round ligament and the anterior cul-de-sac with several areas of superficial endometriosis.  Left pelvic sidewall with the lesion consistent with endometriosis and nodular lesion in the posterior cul-de-sac.    Estimated Blood Loss: Minimal      Specimens:  Multiple peritoneal biopsies from the left pelvic brim, left round ligament, anterior cul-de-sac, and posterior cul-de-sac.    Complications:  None    Disposition: PACU - hemodynamically stable.      Procedure Details    The patient was seen in the Holding Room. The risks, benefits, complications, treatment options, and expected outcomes were discussed with the patient. The patient concurred with the proposed plan, giving informed consent. The patient was identified as Emerita Hutchins and the procedure verified as the procedure described above.   A timeout was held and the above information confirmed.    After these above consents were obtained the patient  was taken to the operating room, where general anesthesia was administered. She was placed in the dorsal lithotomy position with care taken in placement of her legs to avoid nerve injury. She was prepped and draped in the usual sterile fashion. Johnson catheter was inserted. A complete procedural timeout was completed to ensure proper patient and proper procedure.     A supraumbilical skin incision was made with a knife and the Veress needle was inserted carefully and without difficulty. Opening pressure was 3 mmHg and the abdomen insufflated to 15 mmHg. A number 8 bladeless trocar was inserted without difficulty and proper placement was confirmed with the da Josefina scope. Areas that were immediately adjacent to entry were free of injury. Two additional 8 mm da Josefina trocars were placed under direct visualization and one 8 mm AirSeal trocar was placed under direct visualization without difficulty or complication or injury.      The patient was then placed in Trendelenburg position and a CaseReader uterine manipulator was inserted under direct visualization. The robot was docked using a side-docking technique. Monopolar scissors were placed in the right-sided arm and bipolar fenestrated instrument into the left-sided arm.  Examination of the pelvis showed the above findings. The ureters were identified bilaterally.    Physiologic adhesions and may be some endometriosis adhesions of the left colon were taken down using monopolar energy.  There were several areas on the pelvic brim consistent with endometriosis.  These were resected in their entirety under hemostasis.  The round ligament was also noted to be endometriotic.  This portion of the round ligament and the peritoneum surrounding was removed.  Hemostasis was noted.  Anterior cul-de-sac had several lesions consistent with endometriosis these were also resected.  On the left pelvic sidewall, there was 1 endometriotic lesion which could not be resected due to its size and  location.  However, I was able to ablate it.  There was a nodular lesion deep in the posterior cul-de-sac.  This was resected in its entirety and hemostasis was noted.  The left ovary had several superficial areas consistent with endometriosis.  In an effort to retain maximum ovarian function, these were ablated.  There was also a single lesion on the right ovary that was also ablated.  The right pelvic sidewall, pelvic brim, round ligament area were all normal.  Hemostatic powder was added for hemostatic purposes.  After completion of the resection, peristalsis of the left ureter was observed.    The robot was undocked, insufflation was released and the trocars were removed. Skin incisions were closed subcuticularly.  The patient tolerated the procedure well. All instrument counts were correct. She was taken to the recovery room after extubation in stable condition.     Srini Sandoval MD  08/09/24  15:24 CDT

## 2024-08-09 NOTE — ANESTHESIA PROCEDURE NOTES
Airway  Date/Time: 8/9/2024 2:13 PM  End Time:8/9/2024 2:13 PM  Airway not difficult    General Information and Staff    Patient location during procedure: OR  CRNA/CAA: Gopal Perude CRNA  SRNA: Gasper Lomax SRNA  Indications and Patient Condition  Indications for airway management: airway protection    Preoxygenated: yes  Mask difficulty assessment: 1 - vent by mask    Final Airway Details  Final airway type: endotracheal airway      Successful airway: ETT  Cuffed: yes   Successful intubation technique: direct laryngoscopy  Facilitating devices/methods: intubating stylet  Endotracheal tube insertion site: oral  Blade: Betty  Blade size: 3  ETT size (mm): 7.0  Cormack-Lehane Classification: grade I - full view of glottis  Placement verified by: capnometry   Cuff volume (mL): 6  Measured from: lips  ETT/EBT  to lips (cm): 20  Number of attempts at approach: 1  Assessment: lips, teeth, and gum same as pre-op and atraumatic intubation

## 2024-09-04 ENCOUNTER — TELEMEDICINE (OUTPATIENT)
Age: 34
End: 2024-09-04
Payer: COMMERCIAL

## 2024-09-04 DIAGNOSIS — Z78.9 NON-SMOKER: ICD-10-CM

## 2024-09-04 DIAGNOSIS — Z09 POSTOP CHECK: Primary | ICD-10-CM

## 2024-09-04 RX ORDER — CLOMIPHENE CITRATE 50 MG/1
50 TABLET ORAL DAILY
Qty: 5 TABLET | Refills: 3 | Status: SHIPPED | OUTPATIENT
Start: 2024-09-04

## 2024-09-04 NOTE — PROGRESS NOTES
POSTOPERATIVE VIDEO VISIT      HPI  Emerita Hutchins presents for postoperative video visit. The visit from a scheduled appointment was initiated with an audio and visual connection by the patient. The advantages and limitations of video visits were discussed and understood by the patient. She is s/p da Josefina assisted resection of endometriosis. The patient has had a relatively normal postoperative course.  The patient has had no current complaints. The patient has had improving normal postoperative pain.  The patient has had no issues with the wound.     Review of Systems     Past History:  The following portions of the patient's history were reviewed and updated as appropriate: allergies, current medications, past family history, past medical history, past social history, past surgical history, and problem list.      Physical Exam   Constitutional: She appears well-developed and well-nourished.   HENT:   Head: Normocephalic and atraumatic.   Neurological: She is alert.   Psychiatric: She has a normal mood and affect.        Assessment & Plan   Assessment:    1. Postop check    2. Non-smoker        Plan:   Continue postoperative care as instructed     New Medications Ordered This Visit   Medications    clomiPHENE (CLOMID) 50 MG tablet     Sig: Take 1 tablet by mouth Daily on Cycle days 3 thru 7     Dispense:  5 tablet     Refill:  3       Return for will call for follicle U/S when she wants to pursue IUI.     Srini Sandoval MD  9/4/2024    Mode of Visit: Video  Location of patient: home  Location of provider: [facility]  You have chosen to receive care through a telehealth visit.  The patient has signed the video visit consent form.  The visit included audio and video interaction. No technical issues occurred during this visit.    Patient is currently on cycle day 8  They plan to try to conceive spontaneously this month  If not successful, she wishes for clomiphene stimulated cycle, ultrasound surveillance,  followed by artificial insemination  Call for concerns or questions    Srini Sandoval MD

## 2024-09-12 ENCOUNTER — OFFICE VISIT (OUTPATIENT)
Dept: INTERNAL MEDICINE | Facility: CLINIC | Age: 34
End: 2024-09-12
Payer: COMMERCIAL

## 2024-09-12 VITALS
DIASTOLIC BLOOD PRESSURE: 77 MMHG | HEART RATE: 62 BPM | SYSTOLIC BLOOD PRESSURE: 97 MMHG | RESPIRATION RATE: 16 BRPM | OXYGEN SATURATION: 98 % | BODY MASS INDEX: 21.54 KG/M2 | HEIGHT: 65 IN | WEIGHT: 129.3 LBS

## 2024-09-12 DIAGNOSIS — F32.A DEPRESSIVE DISORDER: ICD-10-CM

## 2024-09-12 DIAGNOSIS — Z00.01 ANNUAL VISIT FOR GENERAL ADULT MEDICAL EXAMINATION WITH ABNORMAL FINDINGS: Primary | ICD-10-CM

## 2024-09-12 PROBLEM — R14.0 BLOATING: Status: RESOLVED | Noted: 2023-10-10 | Resolved: 2024-09-12

## 2024-09-12 PROBLEM — K21.9 GASTROESOPHAGEAL REFLUX DISEASE WITHOUT ESOPHAGITIS: Status: RESOLVED | Noted: 2022-08-19 | Resolved: 2024-09-12

## 2024-09-12 PROCEDURE — 99395 PREV VISIT EST AGE 18-39: CPT | Performed by: INTERNAL MEDICINE

## 2024-09-12 NOTE — PROGRESS NOTES
CC: f/u preventive health    History:  Emerita Hutchins is a 33 y.o. female who presents today for evaluation of the above problems.        History of Present Illness  The patient presents for a routine checkup.    She underwent surgery recently for endometriosis and has since recovered well. She experienced some discomfort during the first week post-surgery, but her condition has since improved.    She plans to consult with Dr. Sandoval next month for IUI. If unsuccessful, she intends to start IVF in December 2024.    Her blood pressure is stable, and her weight remains consistent. She had a TSH test in July 2024, which showed normal results per her report.    She is currently on Wellbutrin and reports a positive mood.     ROS:  Review of Systems   Constitutional:  Negative for chills and fever.   HENT:  Negative for congestion and sore throat.    Eyes:  Negative for visual disturbance.   Respiratory:  Negative for cough and shortness of breath.    Cardiovascular:  Negative for chest pain and palpitations.   Gastrointestinal:  Negative for abdominal pain, constipation and nausea.   Endocrine: Negative for cold intolerance and heat intolerance.   Genitourinary:  Negative for difficulty urinating and frequency.   Musculoskeletal:  Negative for arthralgias and back pain.   Skin:  Negative for rash.   Neurological:  Negative for dizziness and headaches.   Psychiatric/Behavioral:  Negative for dysphoric mood. The patient is not nervous/anxious.        No Known Allergies  Past Medical History:   Diagnosis Date    Anxiety     Depression     Family history of colonic polyps     Gastroesophageal reflux disease without esophagitis 08/19/2022    Endoscopy 1/2023 negative for Olvera's esophagus or mass.  No esophagitis.  Continue omeprazole 20 mg daily.      Hypothyroidism      Past Surgical History:   Procedure Laterality Date    DIAGNOSTIC LAPAROSCOPY N/A 8/9/2024    Procedure: DIAGNOSTIC LAPAROSCOPY WITH DAVINCI ROBOT,  "resection of endometriosis, lysis of adhesions;  Surgeon: Srini Sandoval MD;  Location: [facility] OR;  Service: Robotics - DaVinci;  Laterality: N/A;    ENDOSCOPY N/A 01/18/2023    Normal esophagus; Normal stomach; Normal examined duodenum; No specimens collected     Family History   Problem Relation Age of Onset    Diabetes Mother     Hypertension Mother     Hyperlipidemia Mother     Olvera's esophagus Mother     Breast cancer Mother         BRCA testing pending as of 02/16/23    Drug abuse Brother     Olvera's esophagus Maternal Grandmother     Dementia Maternal Grandmother     Colon polyps Maternal Grandfather         < 60 years of age    Cancer Maternal Grandfather     Esophageal cancer Maternal Grandfather     Colon cancer Neg Hx     Liver cancer Neg Hx     Liver disease Neg Hx     Rectal cancer Neg Hx     Stomach cancer Neg Hx       reports that she has never smoked. She has never been exposed to tobacco smoke. She has never used smokeless tobacco. She reports current alcohol use. She reports that she does not use drugs.      Current Outpatient Medications:     buPROPion XL (Wellbutrin XL) 150 MG 24 hr tablet, Take 1 tablet by mouth Daily., Disp: 90 tablet, Rfl: 3    chorionic gonadotropin (Pregnyl) 95835 units injection, Inject 10,000 IU into the muscle as directed, Disp: 1 each, Rfl: 12    clomiPHENE (CLOMID) 50 MG tablet, Take 1 tablet by mouth Daily on Cycle days 3 thru 7, Disp: 5 tablet, Rfl: 3    folic Acid-vit B6-vit B12 (Folplex 2.2) 2.2-25-0.5 MG tablet, Take 1 tablet by mouth once daily., Disp: 30 tablet, Rfl: 11    Needle, Disp, (BD Hypodermic Needle) 25G X 1-1/2\" misc, To inject HCG IM at trigger time, Disp: 1 each, Rfl: 12    prenatal vitamin tablet, Take 1 tablet by mouth Daily., Disp: , Rfl:     Syringe/Needle, Disp, (B-D 3CC LUER-BE SYR 88ZB6-6/2) 18G X 1-1/2\" 3 ML misc, To draw up HCG, Disp: 1 each, Rfl: 12    OBJECTIVE:  BP 97/77 (BP Location: Left arm, Patient Position: Sitting, Cuff " "Size: Adult)   Pulse 62   Resp 16   Ht 164 cm (64.57\")   Wt 58.7 kg (129 lb 4.8 oz)   LMP  (LMP Unknown)   SpO2 98%   BMI 21.80 kg/m²    Physical Exam  Constitutional:       General: She is not in acute distress.     Appearance: She is well-developed.   HENT:      Head: Normocephalic and atraumatic.      Right Ear: Tympanic membrane and external ear normal.      Left Ear: Tympanic membrane and external ear normal.   Eyes:      General: No scleral icterus.     Extraocular Movements: Extraocular movements intact.   Neck:      Trachea: No tracheal deviation.   Cardiovascular:      Rate and Rhythm: Normal rate and regular rhythm.      Heart sounds: Normal heart sounds. No murmur heard.  Pulmonary:      Effort: Pulmonary effort is normal. No accessory muscle usage or respiratory distress.      Breath sounds: Normal breath sounds. No wheezing.   Abdominal:      General: There is no distension.      Palpations: Abdomen is soft.   Musculoskeletal:         General: Normal range of motion.      Cervical back: Normal range of motion and neck supple.      Right lower leg: No edema.      Left lower leg: No edema.   Skin:     General: Skin is warm and dry.      Nails: There is no clubbing.   Neurological:      Mental Status: She is alert and oriented to person, place, and time.      Coordination: Coordination normal.      Gait: Gait normal.   Psychiatric:         Mood and Affect: Mood normal. Mood is not anxious or depressed.         Behavior: Behavior normal.             Assessment/Plan    Diagnoses and all orders for this visit:    1. Annual visit for general adult medical examination with abnormal findings (Primary)  Immunizations:      - Tetanus: Received in 2023      - Influenza: Recommend yearly.      - Prevnar: Once after age 65      - Shingrix: Series after 50      - COVID: Consider booster.   CRC screening: Due at 45  Mammogram:  Due at 40  PAP: was done on approximately 11/2023 and the result was: normal PAP with " negative HPV. Repeat 5 years.   DEXA: DEXA scan at 65  Well controlled and BP goal for age is <140/90 per JNC 8 guidelines    2. Depressive disorder          An After Visit Summary was printed and given to the patient at discharge.  Return in about 1 year (around 9/12/2025) for Annual physical.       Patient or patient representative verbalized consent for the use of Ambient Listening during the visit with  Chacorta Dudley DO for chart documentation. 9/12/2024  10:00 CDT    Chacorta Dudley D.O. 9/12/2024   Electronically signed.

## 2024-10-04 ENCOUNTER — TELEPHONE (OUTPATIENT)
Age: 34
End: 2024-10-04
Payer: COMMERCIAL

## 2024-10-04 DIAGNOSIS — F32.A DEPRESSIVE DISORDER: ICD-10-CM

## 2024-10-04 NOTE — TELEPHONE ENCOUNTER
Called and informed pt that Dr. Sandoval reviewed her u/s and said her lining and follicle are developing well but wants to repeat u/s on Monday with potential IUI on Tuesday, pt voiced understanding and scheduled for u/s on Monday at 0830.

## 2024-10-07 ENCOUNTER — TELEPHONE (OUTPATIENT)
Age: 34
End: 2024-10-07
Payer: COMMERCIAL

## 2024-10-07 DIAGNOSIS — Z31.9 INFERTILITY MANAGEMENT: Primary | ICD-10-CM

## 2024-10-07 RX ORDER — BUPROPION HYDROCHLORIDE 150 MG/1
150 TABLET ORAL DAILY
Qty: 90 TABLET | Refills: 3 | Status: SHIPPED | OUTPATIENT
Start: 2024-10-07

## 2024-10-07 NOTE — TELEPHONE ENCOUNTER
Rx Refill Note  Requested Prescriptions     Pending Prescriptions Disp Refills    buPROPion XL (Wellbutrin XL) 150 MG 24 hr tablet 90 tablet 3     Sig: Take 1 tablet by mouth Daily.      Last office visit with prescribing clinician: 9/12/2024   Last telemedicine visit with prescribing clinician: Visit date not found   Next office visit with prescribing clinician: Visit date not found                         Would you like a call back once the refill request has been completed: [] Yes [] No    If the office needs to give you a call back, can they leave a voicemail: [] Yes [] No    Nicolas Bills MA  10/07/24, 10:17 CDT

## 2024-10-07 NOTE — TELEPHONE ENCOUNTER
Called and spoke to pt and informed her Dr. Sandoval reviewed her u/s and said she can do Pregnyl today and IUI tomorrow or as it looks like she may be rupturing she can do timed intercourse this cycle.  She wanted to do timed intercourse as she did not think that it would be worth it to try IUI this cycle but would call with her next cycle or +UPT.

## 2024-10-07 NOTE — TELEPHONE ENCOUNTER
Spoke with Dr. Drake to review as Dr. Sandoval was out of pocket on vacation and said it was very hard to say at this point as she did not get a +OPK at home, but did not test in the afternoon when she normally surges, but it is worth a shot to go ahead and do timed intercourse.  Pt wanted to know if she could do cycle day 22 progesterone to check for ovulation since we did not have a clear answer as to whether or not she ovulated this cycle or not.  If okay, I am happy to place this order.

## 2024-10-07 NOTE — TELEPHONE ENCOUNTER
----- Message from Sophy WEISS sent at 10/7/2024  1:52 PM CDT -----  Regarding: FW: Question  Contact: [phone]    ----- Message -----  From: Emerita Hutchins  Sent: 10/7/2024  12:27 PM CDT  To: [facility] Clinical Pool  Subject: Question                                         Hi,     I’m sorry, I know we just talked on the phone not too long ago. Just wanted to ask a quick question for Dr. Sandoval just to make sure I’m understanding everything right. I know my follicle has shrunk. Is there any way to tell if I am in the process of ovulating vs my follicle just disappearing? Just hoping to know if we even have a small chance this cycle.

## 2024-10-14 ENCOUNTER — LAB (OUTPATIENT)
Dept: LAB | Facility: HOSPITAL | Age: 34
End: 2024-10-14
Payer: COMMERCIAL

## 2024-10-14 PROCEDURE — 84144 ASSAY OF PROGESTERONE: CPT | Performed by: OBSTETRICS & GYNECOLOGY

## 2024-10-15 LAB — PROGEST SERPL-MCNC: 6.14 NG/ML

## 2024-10-18 ENCOUNTER — TELEPHONE (OUTPATIENT)
Age: 34
End: 2024-10-18
Payer: COMMERCIAL

## 2024-10-18 DIAGNOSIS — Z31.9 INFERTILITY MANAGEMENT: Primary | ICD-10-CM

## 2024-10-18 RX ORDER — CLOMIPHENE CITRATE 50 MG/1
50 TABLET ORAL DAILY
Qty: 5 TABLET | Refills: 0 | Status: SHIPPED | OUTPATIENT
Start: 2024-10-18

## 2024-10-18 NOTE — TELEPHONE ENCOUNTER
Called and informed pt Dr. Sandoval said no need for u/s now but to do CD 10 and Clomid.  Rx sent to [facility] per pt request, u/s scheduled.

## 2024-10-28 ENCOUNTER — TELEPHONE (OUTPATIENT)
Age: 34
End: 2024-10-28
Payer: COMMERCIAL

## 2024-10-28 NOTE — TELEPHONE ENCOUNTER
Called and informed pt Dr. Sandoval reviewed her u/s and wants to repeat it tomorrow, pt voiced understanding.  Scheduled at 1330 tomorrow per pt request due to work schedule.

## 2024-10-29 ENCOUNTER — TELEPHONE (OUTPATIENT)
Age: 34
End: 2024-10-29
Payer: COMMERCIAL

## 2024-10-29 NOTE — TELEPHONE ENCOUNTER
Spoke with patient regarding ultrasound findings.  Concern over thinning of the endometrial lining as well as producing multiple mature follicles which would place her at increased risk of either artificial insemination failure (thin endometrial lining) or higher order pregnancy given the multiple follicles.  Recommend not proceeding with further ultrasounds or insemination or even intercourse.  Patient will discuss with her  and what she wants to do.  She is seriously considering starting an IVF cycle next month.

## 2025-01-08 ENCOUNTER — SPECIALTY PHARMACY (OUTPATIENT)
Dept: PHARMACY | Facility: TELEHEALTH | Age: 35
End: 2025-01-08
Payer: COMMERCIAL

## 2025-01-08 NOTE — PROGRESS NOTES
Specialty Pharmacy Patient Management Program  Initial Assessment     Emerita Hutchins is a 34 y.o. female with infertility and enrolled in the Patient Management program offered by [facility]. An initial outreach was conducted, including assessment of therapy appropriateness and specialty medication education for Follistim, Ganirelix, leuprolide, and Menopur. The patient was introduced to services offered by [facility], including: regular assessments, refill coordination, curbside pick-up or mail order delivery options, prior authorization maintenance, and financial assistance programs as applicable. The patient was also provided with contact information for the pharmacy team.     Insurance Coverage & Financial Support  [employer]      Relevant Past Medical History and Comorbidities  Relevant medical history and concomitant health conditions were discussed with the patient. The patient's chart has been reviewed for relevant past medical history and comorbid health conditions and updated as necessary.   Past Medical History:   Diagnosis Date    Anxiety     Depression     Family history of colonic polyps     Gastroesophageal reflux disease without esophagitis 08/19/2022    Endoscopy 1/2023 negative for Olvera's esophagus or mass.  No esophagitis.  Continue omeprazole 20 mg daily.      Hypothyroidism      Social History     Socioeconomic History    Marital status:    Tobacco Use    Smoking status: Never     Passive exposure: Never    Smokeless tobacco: Never   Vaping Use    Vaping status: Never Used   Substance and Sexual Activity    Alcohol use: Yes     Comment: Occasionally    Drug use: Never    Sexual activity: Yes     Partners: Male     Birth control/protection: None     Problem list reviewed by Radha Luque RPH on 1/8/2025 at  9:08 AM    Allergies  Known allergies and reactions were discussed with the patient. The patient's chart has been reviewed for  allergy information and updated as necessary.   Patient has no known allergies.  Allergies reviewed by Radha Luque RP on 1/8/2025 at  9:08 AM    Current Medication List  This medication list has been reviewed with the patient and evaluated for any interactions or necessary modifications/recommendations, and updated to include all prescription medications, OTC medications, and supplements the patient is currently taking. This list reflects what is contained in the patient's profile, which has also been marked as reviewed to communicate to other providers it is the most up to date version of the patient's current medication therapy.     Current Outpatient Medications:     ALPRAZolam (Xanax) 0.5 MG tablet, Take 1 tablet by mouth Daily As Needed for anxiety. Max of two tablets per day. 1 tablet must be taken on day of transfer.., Disp: 10 tablet, Rfl: 0    azithromycin (ZITHROMAX) 250 MG tablet, Take 2 tablets on day 1, then take 1 tablet every day after. DO NOT START UNTIL COORDINATOR INSTRUCTS TO DO SO., Disp: 6 tablet, Rfl: 0    buPROPion XL (Wellbutrin XL) 150 MG 24 hr tablet, Take 1 tablet by mouth Daily., Disp: 90 tablet, Rfl: 3    Choriogonadotropin Rosalino (Ovidrel) 250 MCG/0.5ML solution prefilled syringe, Inject 250 mcg under the skin into the appropriate area as directed. DO NOT START UNTIL GIVEN SPECIFIC DATE AND TIME by coordinator., Disp: 0.5 mL, Rfl: 0    chorionic gonadotropin (Pregnyl) 45843 units injection, Inject 10,000 IU into the muscle as directed, Disp: 1 each, Rfl: 12    chorionic gonadotropin (Pregnyl) 52873 units injection, Inject 10,000 Units into the appropriate muscle as directed by prescriber. Mix 1mL of liquid with powder. START ONCE INSTRUCTED BY COORDINATOR., Disp: 1 each, Rfl: 0    clomiPHENE (CLOMID) 50 MG tablet, Take 1 tablet by mouth Daily on Cycle days 3 thru 7, Disp: 5 tablet, Rfl: 0    Enoxaparin Sodium (LOVENOX) 60 MG/0.6ML solution prefilled syringe syringe, Inject 0.6 mL under  "the skin into the appropriate area as directed Daily. Inject subcutaneous into abdomen once instructed to start by coordinator., Disp: 18 mL, Rfl: 3    ethynodiol-ethinyl estradiol (Zovia 1/35, 28,) 1-35 MG-MCG per tablet, Take 1 tablet by mouth Daily DO NOT START UNTIL INSTRUCTED BY COORDINATOR., Disp: 28 tablet, Rfl: 2    folic Acid-vit B6-vit B12 (Folplex 2.2) 2.2-25-0.5 MG tablet, Take 1 tablet by mouth once daily., Disp: 30 tablet, Rfl: 11    Follitropin Rosalino (Gonal-f RFF Rediject) 900 UNIT/1.5ML solution pen-injector, Inject 150 Units under the skin into the appropriate area as directed Every Morning once instructed to start by coordinator.., Disp: 1.5 mL, Rfl: 0    Follitropin Beta (Follistim AQ) 900 UNT/1.08ML solution, Inject 150 Units under the skin into the appropriate area as directed Every Morning., Disp: 1.08 mL, Rfl: 0    Ganirelix Acetate 250 MCG/0.5ML solution prefilled syringe, Inject 250 mcg (1 syringe) under the skin into the appropriate area as directed Every Morning., Disp: 3.5 mL, Rfl: 0    Leuprolide Acetate 1 MG/0.2ML kit, Inject 0.2 mL under the skin into the appropriate area as directed, Disp: 1 each, Rfl: 0    Menotropins (Menopur) 75 units reconstituted solution, Inject 150 Units under the skin into the appropriate area as directed Every Evening., Disp: 20 each, Rfl: 0    Needle, Disp, (BD Disp Needles) 27G X 1/2\" misc, Inject 1 each under the skin into the appropriate area as directed Daily. Use inject Menopur subcutaneous every PM., Disp: 12 each, Rfl: 0    Needle, Disp, (BD Hypodermic Needle) 25G X 1-1/2\" misc, To inject HCG IM at trigger time, Disp: 1 each, Rfl: 12    Needle, Disp, (BD PrecisionGlide Needle) 23G X 1-1/2\" misc, When instructed to start RADU by coordinator, use this needle to inject IM into buttocks., Disp: 30 each, Rfl: 3    predniSONE (DELTASONE) 20 MG tablet, Take 1 tablet by mouth Daily.; when instructed to start by coordinator., Disp: 30 tablet, Rfl: 3    prenatal " "vitamin tablet, Take 1 tablet by mouth Daily., Disp: , Rfl:     progesterone oil 50 MG/ML injection, Inject 1 mL into the buttock muscle as directed by prescriber Daily. Start after being instructed by coordinator., Disp: 30 mL, Rfl: 3    Syringe, Disposable, (Syringe 2-3 ML) 3 ML misc, Attach to Q cap to draw up Menopur, then attach to 27 gauge needle to inject Menopur subcutaneous in PM, Disp: 12 each, Rfl: 0    Syringe/Needle, Disp, (B-D 3CC LUER-BE SYR 15DS6-9/2) 18G X 1-1/2\" 3 ML misc, To draw up HCG, Disp: 1 each, Rfl: 12    Syringe/Needle, Disp, (B-D 3CC LUER-BE SYR 37SQ7-4/2) 18G X 1-1/2\" 3 ML misc, Use to draw up RADU daily, after being instructed to start by coordinator., Disp: 30 each, Rfl: 3    Syringe/Needle, Disp, (B-D 3CC LUER-BE SYR 00CK6-5/2) 23G X 1-1/2\" 3 ML misc, Use syringe and needle to mix and inject Pregnyl IM into buttocks once instructed by coordinator., Disp: 1 each, Rfl: 0  Medicines reviewed by Radha Luque [facility] on 1/8/2025 at  9:08 AM    Drug Interactions  none     Relevant Laboratory Values  Lab Results   Component Value Date    GLUCOSE 87 08/19/2022    CALCIUM 9.3 08/19/2022     08/19/2022    K 4.0 08/19/2022    CO2 22.0 08/19/2022     08/19/2022    BUN 18 08/19/2022    CREATININE 0.92 08/19/2022    BCR 19.6 08/19/2022    ANIONGAP 11.0 08/19/2022     Lab Results   Component Value Date    WBC 3.93 07/31/2024    HGB 13.1 07/31/2024    HCT 39.8 07/31/2024    MCV 87.3 07/31/2024     07/31/2024     Lab Value Review  The above lab values have been reviewed; the following specialty medication(s) dose adjustment(s) are recommended: none.    Initial Education Provided for Specialty Medication  The patient has been provided with the following education and any applicable administration techniques (i.e. self-injection) have been demonstrated for the therapies indicated. All questions and concerns have been addressed prior to the patient receiving the medication, and the " patient has verbalized understanding of the education and any materials provided. Additional patient education shall be provided and documented upon request by the patient, provider or payer.      (Follitropin qamar)- Follistim and Gonal-F.  Medication Expectations   Why am I taking this medication? You are taking this medication because you are trying to become pregnant.   What should I expect while on this medication? You will be on a regimented schedule that your physician should provide for you.  The goal is to achieve pregnancy.   How does the medication work? Follitropin qamar is a gonadotropin fertility drug that contains recombinant follicle-stimulating hormone (r-FSH.)  Follitropin stimulates the ovary to produce healthy eggs.  It is most commonly used in conjunction with fertility treatments, especially IVF, to encourage women’s ovaries to produce multiple eggs in one cycle.  Folliptropin is also used to treat female infertility caused by irregular or anovulation.  For men, follitropin can help to increase and improve sperm counts to treat male infertility.     How long will I be on this medication for? The amount of time you will be on this medication will be determined by your doctor and your response to the medication.    How do I take this medication? Take as directed on your prescription label. This medication is an injectable.  You may have to mix this medication yourself or you may receive a prefilled pen.   What are some possible side effects? Potential side effects include Side Effects Include,headache,drowsiness or dizziness,vaginal bleeding,injection site reactions,upper respiratory tract infections,stomach pain,nausea, acne, breast tenderness, headache, mood changes, such as irritability, restless feelings, or anger, pain, irritation, or inflammation at site where injected.   What happens if I miss a dose? If you miss a dose, you must call your doctor immediately.  They will instruct you on what  to do.     Medication Safety   What are things I should warn my doctor immediately about? Hypersensitivity reactions - trouble breathing or swallowing. If you show signs of Ovarian hyperstimulation syndrome (severe pain in your lower stomach, nausea, vomiting, diarrhea;bloating, rapid weight gain;little or no urination; or trouble breathing). Serious pulmonary complications such as fever with shortness of breath or rapid breathing. Signs of a blood clot, sudden numbness or weakness, problems with vision or speech, swelling or redness in an arm or leg.   What are things that I should be cautious of? Hypersensitivity reactions and injection site reactions.   What are some medications that can interact with this one? There are no significant drug interactions.     Medication Storage/Handling   How should I handle this medication? Keep this medication out of reach of pets/children in original container.  This medication    How does this medication need to be stored? Store powder in the refrigerator or at room temperature between 2 and 25 degrees C (36 and 77 degrees F). Protect from light. After the powder is dissolved, the multi-dose vial and the pen may be stored in the refrigerator or at room temperature between 2 and 25 degrees C (36 and 77 degrees F) for up to 28 days. Single use vials should be used immediately after the powder is dissolved.    How should I dispose of this medication? Throw away any unused medicine after the expiration date.     Resources/Support   How can I remind myself to take this medication? There are several reminder apps available.  There are also printable calendars online.  Your doctor may also provide you with a detailed schedule for you to see.   Is financial support available?  There are a wide variety of discount cards available if your medication is not approved by your insurance.    Which vaccines are recommended for me? Talk to your doctor about these vaccines: Flu, Coronavirus  (COVID-19), Pneumococcal (pneumonia), Tdap, Hepatitis B.     Menopur (menotropins)  Medication Expectations   Why am I taking this medication? You are taking this medication because you are trying to become pregnant.   What should I expect while on this medication? You will be on a regimented schedule that your physician should provide for you.  The goal is to achieve pregnancy.   How does the medication work? Menotropins is a purified combination of follicle stimulating hormone (FSH) and luteinizing hormone (LH) extracted from the urine of postmenopausal women. Treatment provides ovarian follicular growth and maturation in females who do not have primary ovarian failure. Also stimulates spermatogenesis in males (off-label use)   How long will I be on this medication for? The amount of time you will be on this medication will be determined by your doctor and your response to the medication.    How do I take this medication? This medication must be mixed before administration.  It is reconstituted with 1-2 ml's of 0.9% NaCl as instructed by MD.  Use immediately after reconstituted.  This is  sub-q injection given in the abdomen.  Ensure injection sites are rotated.   What are some possible side effects? Headache, Mild nausea or stomach pain Pain, redness, or swelling where the shot was given.   What happens if I miss a dose? If you miss a dose, you must call your doctor immediately.  They will instruct you on what to do.     Medication Safety   What are things I should warn my doctor immediately about? Bloating, pain, distension of abdomen, nausea, vomiting, decreased urine output.  These could be signs of ovarian hyperstimulation syndrome.   What are things that I should be cautious of? Hypersensitivity reactions and injection site reactions.   What are some medications that can interact with this one? There are no drug interactions associated with Menopur.     Medication Storage/Handling   How should I handle this  medication? Keep this medication out of reach of pets/children in original container.     How does this medication need to be stored? Store unreconstituted product between 36-77 degrees.  Once the product is reconstituted it should be used immediately.  Discard any unused portion.   How should I dispose of this medication? Discard used syringes in a sharps container.     Resources/Support   How can I remind myself to take this medication? There are several reminder apps available.  There are also printable calendars online.  Your doctor may also provide you with a detailed schedule for you to see.   Is financial support available?  There are a wide variety of discount cards available if your medication is not approved by your insurance.    Which vaccines are recommended for me? Talk to your doctor about these vaccines: Flu, Coronavirus (COVID-19), Pneumococcal (pneumonia), Tdap, Hepatitis B.     Leuprolide (14 Day Patient Administration Kit)  Medication Expectations   Why am I taking this medication? You are taking this medication because you are trying to become pregnant.   What should I expect while on this medication? You will be on a regimented schedule that your physician should provide for you.  The goal is to achieve pregnancy.   How does the medication work? -potentially stimulate more follicles to develop during a stimulation cycle (short term use),  -suppress your ovaries from growing follicles before starting specific protocols (long term use),  -prevents premature ovulation of your developing follicles, or  -trigger your follicles to mature before an egg retrieval   How long will I be on this medication for? The amount of time you will be on this medication will be determined by your doctor and your response to the medication.  When using Lupron as an IVF trigger, Lupron is typically administered about 36 hours before the egg retrieval.   How do I take this medication? Preparation    Use the syringes  provided by the . If it is absolutely necessary to use a different syringe, only a low-dose insulin syringe should be used.  After removing the syringe from the outer wrapping, pull the plunger back until the tip is at the 0.2 mL or 20 unit coty.  Remove the cover from the needle and push the needle through the rubber stopper. Push the plunger all the way in to inject air into the bottle.  Turn the bottle upside down, making sure the tip of the needle is in the liquid. Slowly pull back on the plunger until the syringe fills to the 0.2 mL or 20 unit coty.  With the needle still in the bottle and the bottle upside down, check for air bubbles in the syringe. Slowly push any air bubbles back into the bottle and pull the plunger back again to fill to the 0.2 mL or 20 unit coty.    20 units of Lupron = 1 mg = 0.2 mL    Subcutaneous injection    Cleanse the injection spot with alcohol. Hold the skin taut, or pull up a little flesh with the hand not holding the syringe.  Hold the syringe as you would a pencil and thrust the needle into the skin at a 90-degree angle. Push the plunger to administer the injection.  Hold an alcohol wipe on your skin where the needle is inserted and withdraw the needle at the same angle it was inserted. Properly dispose of the syringe.      What are some possible side effects? Abdominal pain, mood swings, diarrhea, dizziness, fatigue, headache, hot flashes.   What happens if I miss a dose? If you miss a dose, you must call your doctor immediately.  They will instruct you on what to do.     Medication Safety   What are things I should warn my doctor immediately about? Allergic reaction: itching or hives, swelling in your face or hands, swelling or tingling in your mouth or throat, chest tightness, trouble breathing, rapid weight gain, swelling in your hands, ankles, or feet, seizures, severe depression, loss of interest in usual activities, trouble concentrating, mood swings, sudden  loss of vision, double vision, bulging eyes, migraine headache, unusual or severe bone or back pain.   What are things that I should be cautious of? Hypersensitivity reactions and injection site reactions.   What are some medications that can interact with this one? There are many drugs that can interact with Lupron.  The most common ones are ones that treat seizures or steroid medicine (such as hydrocortisone, methylprednisolone, prednisone, prednisolone, or dexamethasone     Medication Storage/Handling   How should I handle this medication? Keep this medication out of reach of pets/children in original container.     How does this medication need to be stored? Store below 77 degrees F.     How should I dispose of this medication? Discard used syringes in a sharps container.     Resources/Support   How can I remind myself to take this medication? There are several reminder apps available.  There are also printable calendars online.  Your doctor may also provide you with a detailed schedule for you to see.   Is financial support available?  There are a wide variety of discount cards available if your medication is not approved by your insurance.    Which vaccines are recommended for me? Talk to your doctor about these vaccines: Flu, Coronavirus (COVID-19), Pneumococcal (pneumonia), Tdap, Hepatitis B.     Fyremadel (Ganirelix)  Medication Expectations   Why am I taking this medication? You are taking this medication because you are trying to become pregnant.   What should I expect while on this medication? You will be on a regimented schedule that your physician should provide for you.  The goal is to achieve pregnancy.   How does the medication work? Ganirelix acetate is a synthetic decapeptide, gonadotropin-releasing hormone (GnRH) antagonist. It competitively blocks GnRH receptors in the pituitary pathway, suppressing the synthesis and secretion of luteinizing hormone (LH) and follicle-stimulating hormone (FSH)    How long will I be on this medication for? The amount of time you will be on this medication will be determined by your doctor and your response to the medication.    How do I take this medication? No reconstitution is required prior to administration. Administer SUBQ in abdomen (around navel) or upper thigh; rotate injection site. The pre-filled syringe is used only once, dispose of properly after use.   What are some possible side effects? Headache Mild nausea or stomach pain Pain, redness, or swelling where the shot was given Unusual vaginal bleeding   What happens if I miss a dose? If you miss a dose, you must call your doctor immediately.  They will instruct you on what to do.     Medication Safety   What are things I should warn my doctor immediately about? Rapid weight gain or bloating Severe or ongoing nausea, vomiting, diarrhea Severe stomach or pelvic pain   What are things that I should be cautious of? Hypersensitivity reactions and injection site reactions.   What are some medications that can interact with this one? Antipsychotic medications results in down-regulation of the number of pituitary GnRH receptors and may interfere with the response to ganirelix.     Medication Storage/Handling   How should I handle this medication? Keep this medication out of reach of pets/children in original container.     How does this medication need to be stored? Store at room temperature, this medication does not need to be mixed.  It is a single use, prefilled syringe.   How should I dispose of this medication? Discard used syringes in a sharps container.     Resources/Support   How can I remind myself to take this medication? There are several reminder apps available.  There are also printable calendars online.  Your doctor may also provide you with a detailed schedule for you to see.   Is financial support available?  There are a wide variety of discount cards available if your medication is not approved by your  insurance.    Which vaccines are recommended for me? Talk to your doctor about these vaccines: Flu, Coronavirus (COVID-19), Pneumococcal (pneumonia), Tdap, Hepatitis B.         Adherence, Self-Administration, and Current Therapy Problems  Adherence related to the patient's specialty therapy was discussed with the patient. The Adherence segment of this outreach has been reviewed and updated.          Additional Barriers to Patient Self-Administration: None identified  Methods for Supporting Patient Self-Administration: n/a    Open Medication Therapy Problems  No medication therapy recommendations to display    Goals of Therapy   Goals Addressed Today        Specialty Pharmacy General Goal      Achieve successful ovarian stimulation              Reassessment Plan & Follow-Up  Medication Therapy Changes: Patient is starting Follistim, Ganirelix, leuprolide, Menopur, Pregnyl, and Ovidrel for IVF. She already has Pregnyl and Ovidrel at home.   Additional Plans, Therapy Recommendations, or Therapy Problems to Be Addressed: none   Pharmacist to perform regular reassessments no more than (6) months from the previous assessment.  Welcome information and patient satisfaction survey to be sent by retail team with patient's initial fill.  Care Coordinator to set up future refill outreaches, coordinate prescription delivery, and escalate clinical questions to pharmacist.     Attestation  I attest the patient was actively involved in and has agreed to the above plan of care. I attest that the initiated specialty medication(s) are appropriate for the patient based on my assessment. If the prescribed therapy is at any point deemed not appropriate based on the current or future assessments, a consultation will be initiated with the patient's specialty care provider to determine the best course of action. The revised plan of therapy will be documented along with any reassessments and/or additional patient education provided.      Electronically signed by Radha Luque RPH, 01/08/25, 9:09 AM EST.

## 2025-02-10 ENCOUNTER — SPECIALTY PHARMACY (OUTPATIENT)
Dept: PHARMACY | Facility: TELEHEALTH | Age: 35
End: 2025-02-10
Payer: COMMERCIAL

## 2025-02-25 LAB — PROGEST SERPL-MCNC: >40 NG/ML

## 2025-03-14 ENCOUNTER — INITIAL PRENATAL (OUTPATIENT)
Dept: OBSTETRICS AND GYNECOLOGY | Age: 35
End: 2025-03-14
Payer: COMMERCIAL

## 2025-03-14 VITALS — WEIGHT: 128.2 LBS | SYSTOLIC BLOOD PRESSURE: 114 MMHG | BODY MASS INDEX: 21.62 KG/M2 | DIASTOLIC BLOOD PRESSURE: 68 MMHG

## 2025-03-14 DIAGNOSIS — O26.811 FATIGUE DURING PREGNANCY IN FIRST TRIMESTER: ICD-10-CM

## 2025-03-14 DIAGNOSIS — O09.811 PREGNANCY RESULTING FROM IN VITRO FERTILIZATION IN FIRST TRIMESTER: Primary | ICD-10-CM

## 2025-03-14 DIAGNOSIS — Z3A.09 9 WEEKS GESTATION OF PREGNANCY: ICD-10-CM

## 2025-03-14 RX ORDER — ASPIRIN 81 MG/1
81 TABLET ORAL DAILY
COMMUNITY

## 2025-03-14 NOTE — PROGRESS NOTES
[facility]   HISTORY AND PHYSICAL  Subjective   Subjective     Chief Complaint:   Chief Complaint   Patient presents with    Initial Prenatal Visit     Pt here for new obv. .       History of Present Illness  Emerita Hutchins is a 34 y.o. female  who presents for new OB. Fresh transfer IVF in January with [facility] in [place]. Doing well. Fatigue mainly. Nausea at night. No bleeding. Some cramping. 5 embryos, 1 fresh for this pregnancy. 2 of the frozen with abnormal genetics. The other two are normal. IVF has her on progesterone shots until next week. On aspirin and lovenox as well? No h/o VTE/DVT. BERTIN wanted her on lovenox for endometriosis during implantation. On synthroid as well for TSH that was mildly high for transfer. Wants it checked.     Review of Systems   Constitutional:  Positive for fatigue.   Gastrointestinal:  Positive for nausea. Negative for vomiting.   Genitourinary:  Negative for decreased urine volume, difficulty urinating, dyspareunia, dysuria, enuresis, flank pain, frequency, genital sores, hematuria, menstrual problem, pelvic pain, urgency, vaginal bleeding, vaginal discharge and vaginal pain.   All other systems reviewed and are negative.       Personal History     OB History    Para Term  AB Living   1 0 0 0 0 0   SAB IAB Ectopic Molar Multiple Live Births   0 0 0 0 0 0      # Outcome Date GA Lbr Declan/2nd Weight Sex Type Anes PTL Lv   1 Current              Past Medical History:   Diagnosis Date    Anxiety     Depression     Family history of colonic polyps     Gastroesophageal reflux disease without esophagitis 2022    Endoscopy 2023 negative for Olvera's esophagus or mass.  No esophagitis.  Continue omeprazole 20 mg daily.      Hypothyroidism      Past Surgical History:   Procedure Laterality Date    DIAGNOSTIC LAPAROSCOPY N/A 2024    Procedure: DIAGNOSTIC LAPAROSCOPY WITH DAVINCI ROBOT, resection of endometriosis, lysis of  adhesions;  Surgeon: Srini Sandoval MD;  Location: [facility] OR;  Service: Robotics - DaVinci;  Laterality: N/A;    ENDOSCOPY N/A 01/18/2023    Normal esophagus; Normal stomach; Normal examined duodenum; No specimens collected       Home Medications:  aspirin, buPROPion XL, folic Acid-vit B6-vit B12, levothyroxine, prenatal vitamin, and progesterone oil    Allergies:  She has no known allergies.    Objective    Objective     Vitals:   BP: (114)/(68) 114/68  /68   Wt 58.2 kg (128 lb 3.2 oz)   BMI 21.62 kg/m²     Physical Exam  Vitals reviewed.   Constitutional:       General: She is not in acute distress.     Appearance: Normal appearance. She is not ill-appearing.   HENT:      Head: Normocephalic and atraumatic.      Nose: No congestion or rhinorrhea.   Eyes:      General: No scleral icterus.        Right eye: No discharge.         Left eye: No discharge.      Extraocular Movements: Extraocular movements intact.      Conjunctiva/sclera: Conjunctivae normal.   Pulmonary:      Effort: Pulmonary effort is normal. No accessory muscle usage or respiratory distress.   Musculoskeletal:      Right lower leg: No edema.      Left lower leg: No edema.   Skin:     General: Skin is warm and dry.      Coloration: Skin is not ashen, cyanotic or jaundiced.   Neurological:      General: No focal deficit present.      Mental Status: She is alert and oriented to person, place, and time.   Psychiatric:         Mood and Affect: Mood normal.         Behavior: Behavior is cooperative.         Result Review    US Ob < 14 Weeks Single or First Gestation (03/14/2025 07:55)   Intrauterine pregnancy at 9w1d by crown-rump length  Fetal heart rate: 184 bpm  Bilateral ovaries appear normal        Assessment & Plan   Assessment / Plan     Diagnoses and all orders for this visit:    1. Pregnancy resulting from in vitro fertilization in first trimester (Primary)  -     TSH  -     T4, Free  -     Ambulatory Referral to M/Perinatology  -      ToxASSURE Select 13 (MW) - Urine, Clean Catch  -     Urine Culture - Urine, Urine, Clean Catch    2. 9 weeks gestation of pregnancy    3. Fatigue during pregnancy in first trimester  -     CBC (No Diff)  -     Iron Profile  -     Vitamin B12  -     Folate        Discussion:   New OB Visit. US ordered today, reviewed and shows 9 weeks gestation, EDC 10/16/2025.  Having nausea, fatigue, but no headaches  New OB labs - already done with IVF clinic.   Records request for IVF labs/records  Discussed OTC Unisom and B6  Discussed COVID vaccine, Tdap, and flu vaccine recommendations  Discussed ffDNA screening option - desires  Discussed normal prenatal routines  Questions answered      Return in about 4 weeks (around 4/11/2025) for OB visit.    Jerry Dominguez MD

## 2025-03-15 LAB
ERYTHROCYTE [DISTWIDTH] IN BLOOD BY AUTOMATED COUNT: 12.1 % (ref 12.3–15.4)
FOLATE SERPL-MCNC: >20 NG/ML (ref 4.78–24.2)
HCT VFR BLD AUTO: 38.2 % (ref 34–46.6)
HGB BLD-MCNC: 12 G/DL (ref 12–15.9)
IRON SATN MFR SERPL: 27 % (ref 20–50)
IRON SERPL-MCNC: 96 MCG/DL (ref 37–145)
MCH RBC QN AUTO: 28.9 PG (ref 26.6–33)
MCHC RBC AUTO-ENTMCNC: 31.4 G/DL (ref 31.5–35.7)
MCV RBC AUTO: 92 FL (ref 79–97)
PLATELET # BLD AUTO: 328 10*3/MM3 (ref 140–450)
RBC # BLD AUTO: 4.15 10*6/MM3 (ref 3.77–5.28)
TIBC SERPL-MCNC: 361 MCG/DL
UIBC SERPL-MCNC: 265 MCG/DL (ref 112–346)
VIT B12 SERPL-MCNC: 662 PG/ML (ref 211–946)
WBC # BLD AUTO: 10.4 10*3/MM3 (ref 3.4–10.8)

## 2025-03-17 PROCEDURE — 87086 URINE CULTURE/COLONY COUNT: CPT

## 2025-03-17 PROCEDURE — 0202U NFCT DS 22 TRGT SARS-COV-2: CPT

## 2025-03-21 LAB
BACTERIA UR CULT: NO GROWTH
BACTERIA UR CULT: NORMAL
DRUGS UR: NORMAL
T4 FREE SERPL-MCNC: 1.26 NG/DL (ref 0.92–1.68)
TSH SERPL DL<=0.005 MIU/L-ACNC: 0.59 UIU/ML (ref 0.27–4.2)

## 2025-03-26 ENCOUNTER — CLINICAL SUPPORT (OUTPATIENT)
Dept: OBSTETRICS AND GYNECOLOGY | Age: 35
End: 2025-03-26
Payer: COMMERCIAL

## 2025-03-26 DIAGNOSIS — Z13.79 ENCOUNTER FOR OTHER SCREENING FOR GENETIC AND CHROMOSOMAL ANOMALIES: Primary | ICD-10-CM

## 2025-04-09 ENCOUNTER — ROUTINE PRENATAL (OUTPATIENT)
Dept: OBSTETRICS AND GYNECOLOGY | Age: 35
End: 2025-04-09
Payer: COMMERCIAL

## 2025-04-09 VITALS — SYSTOLIC BLOOD PRESSURE: 106 MMHG | BODY MASS INDEX: 22.59 KG/M2 | WEIGHT: 131.6 LBS | DIASTOLIC BLOOD PRESSURE: 64 MMHG

## 2025-04-09 DIAGNOSIS — K59.00 CONSTIPATION DURING PREGNANCY IN FIRST TRIMESTER: ICD-10-CM

## 2025-04-09 DIAGNOSIS — Z3A.12 12 WEEKS GESTATION OF PREGNANCY: Primary | ICD-10-CM

## 2025-04-09 DIAGNOSIS — O99.611 CONSTIPATION DURING PREGNANCY IN FIRST TRIMESTER: ICD-10-CM

## 2025-04-09 DIAGNOSIS — O09.811 PREGNANCY RESULTING FROM IN VITRO FERTILIZATION IN FIRST TRIMESTER: ICD-10-CM

## 2025-04-09 LAB
GLUCOSE UR STRIP-MCNC: NEGATIVE MG/DL
PROT UR STRIP-MCNC: NEGATIVE MG/DL

## 2025-04-09 NOTE — PROGRESS NOTES
Chief Complaint   Patient presents with    Routine Prenatal Visit     Patient here for routine prenatal visit. Denies any contractions, bleeding and leakage of fluid.        No complaints. Some constipation - using stool softeners. Denies regular contractions, leakage of fluid, vaginal bleeding or discharge.    Total weight gain: 1.633 kg (3 lb 9.6 oz)  Expected weight gain: 11.5 kg (25 lb)-16 kg (35 lb)  /64   Wt 59.7 kg (131 lb 9.6 oz)   LMP  (LMP Unknown)   BMI 22.59 kg/m²     Prenatal Labs  Lab Results   Component Value Date    ABO O 08/09/2024    ABSCRN Negative 08/09/2024    HGB 12.0 03/14/2025    HEPCVIRUSABY Non-Reactive 08/19/2021    URINECX No growth 03/17/2025             Diagnoses and all orders for this visit:    1. 12 weeks gestation of pregnancy (Primary)  -     POC Urinalysis Dipstick    2. Pregnancy resulting from in vitro fertilization in first trimester    3. Constipation during pregnancy in first trimester      MFM anatomy 5/21  BERTIN - recommended cervical length at 16 weeks  Thyroid - recheck at next visit  RTC 3 weeks

## 2025-05-06 ENCOUNTER — ROUTINE PRENATAL (OUTPATIENT)
Age: 35
End: 2025-05-06
Payer: COMMERCIAL

## 2025-05-06 VITALS — DIASTOLIC BLOOD PRESSURE: 88 MMHG | BODY MASS INDEX: 22.9 KG/M2 | SYSTOLIC BLOOD PRESSURE: 120 MMHG | WEIGHT: 133.4 LBS

## 2025-05-06 DIAGNOSIS — Z34.02 PRIMIGRAVIDA, SECOND TRIMESTER: ICD-10-CM

## 2025-05-06 DIAGNOSIS — Z86.39 HISTORY OF UNDERACTIVE THYROID: ICD-10-CM

## 2025-05-06 DIAGNOSIS — O09.819 PREGNANCY RESULTING FROM IN VITRO FERTILIZATION, ANTEPARTUM: ICD-10-CM

## 2025-05-06 DIAGNOSIS — Z3A.16 16 WEEKS GESTATION OF PREGNANCY: Primary | ICD-10-CM

## 2025-05-06 LAB
GLUCOSE UR STRIP-MCNC: NEGATIVE MG/DL
PROT UR STRIP-MCNC: NEGATIVE MG/DL

## 2025-05-06 NOTE — PROGRESS NOTES
Reason for visit: Routine OB visit at 16w5d      History of Present Illness  The patient is a 34-year-old female who presents for a prenatal visit at 16 weeks gestation.    She reports experiencing daily headaches for the past month, which are more severe upon awakening and tend to subside as the day progresses. However, they recur in the evening with a throbbing sensation. She has not experienced any visual disturbances and maintains that her blood pressure remains within normal limits. She discontinued progesterone injections at 11 weeks gestation and began experiencing headaches at 13 weeks. She speculates that these headaches may be due to hormonal fluctuations following the cessation of progesterone therapy.    She also requests a re-evaluation of her TSH levels, as it has been a month since her last assessment. She admits to inconsistent use of Synthroid due to her work schedule. Her TSH levels have previously decreased from 4.5 to 2.4 within a week and a half of starting Synthroid, and further reduced to 0.5.    She reports an improvement in her energy levels, which were significantly low during the first trimester, necessitating frequent naps and early bedtimes. She did not experience morning sickness.       ROS: All systems reviewed and are negative with exception as noted above.       /88   Wt 60.5 kg (133 lb 6.4 oz)   LMP  (LMP Unknown)   BMI 22.90 kg/m²   Total weight gain: 2.449 kg (5 lb 6.4 oz)  Total weight gain expected 11.5 kg (25 lb)-16 kg (35 lb)    Prenatal Assessment  Fetal Heart Rate: 141  Movement: Increased    16-week today: Cervical length of 3.7 cm     Anatomy scan scheduled for 5/29/2025    Exam:  General Appearance:  No visualized signs of distress. Normal mood and behavior.  Cardiorespiratory: HR str and reg. Lungs clear. Breathing even and unlabored.  Abdomen: soft and nontender. No CVA tenderness. Gravid uterus.  Extremeties: no edema. Calves non-tender.             Impression  Diagnoses and all orders for this visit:    1. 16 weeks gestation of pregnancy (Primary)  -     POC Urinalysis Dipstick    2. Primigravida, second trimester    3. History of underactive thyroid  -     TSH Rfx On Abnormal To Free T4    4. Pregnancy resulting from in vitro fertilization, antepartum          Assessment & Plan  1. Prenatal visit at 16 weeks gestation.  - Fetal heart rate: 141 bpm  - Cervix measurement: 3.7 cm  - No chromosomal concerns based on panorama results  - Pain and pelvic pain bleeding warnings reviewed.    2. Headaches.  - Claritin or Zyrtec daily  - Flonase  - Tylenol with Benadryl or Tylenol with Reglan for migraine-like headaches  - Excedrin Tension Headache  - Topical magnesium rub    3. Thyroid management.  - TSH test to be conducted today  - Emphasis on consistent Synthroid intake to maintain appropriate TSH levels during pregnancy    Follow-up  Follow up in 4 weeks with Dr. Dominguez.    Refer to the AVS instructions for additional education provided.         This note has been signed electronically.    Aruna Dang, RIAZ, APRN, CNM    Patient or patient representative verbalized consent for the use of Ambient Listening during the visit with  JOSEPH Johnson for chart documentation. 5/6/2025  11:50 CDT

## 2025-05-07 LAB — TSH SERPL DL<=0.005 MIU/L-ACNC: 1.01 UIU/ML (ref 0.45–4.5)

## 2025-05-12 PROBLEM — O09.819 PREGNANCY RESULTING FROM IN VITRO FERTILIZATION, ANTEPARTUM: Status: ACTIVE | Noted: 2025-05-12

## 2025-05-23 ENCOUNTER — ROUTINE PRENATAL (OUTPATIENT)
Dept: OBSTETRICS AND GYNECOLOGY | Age: 35
End: 2025-05-23
Payer: COMMERCIAL

## 2025-05-23 VITALS — WEIGHT: 133.6 LBS | SYSTOLIC BLOOD PRESSURE: 106 MMHG | BODY MASS INDEX: 22.93 KG/M2 | DIASTOLIC BLOOD PRESSURE: 64 MMHG

## 2025-05-23 DIAGNOSIS — Z34.02 PRIMIGRAVIDA, SECOND TRIMESTER: ICD-10-CM

## 2025-05-23 DIAGNOSIS — Z3A.19 19 WEEKS GESTATION OF PREGNANCY: Primary | ICD-10-CM

## 2025-05-23 DIAGNOSIS — O09.812 PREGNANCY RESULTING FROM IN VITRO FERTILIZATION IN SECOND TRIMESTER: ICD-10-CM

## 2025-05-23 LAB
GLUCOSE UR STRIP-MCNC: NEGATIVE MG/DL
PROT UR STRIP-MCNC: NEGATIVE MG/DL

## 2025-05-23 NOTE — PROGRESS NOTES
Chief Complaint   Patient presents with    Routine Prenatal Visit     Patient here for routine prenatal visit. Denies any contractions, bleeding and leakage of fluid.        No complaints. Endorses fetal movement. Denies regular contractions, leakage of fluid, vaginal bleeding or discharge.    Total weight gain: 2.54 kg (5 lb 9.6 oz)  Expected weight gain: 11.5 kg (25 lb)-16 kg (35 lb)  /64   Wt 60.6 kg (133 lb 9.6 oz)   LMP  (LMP Unknown)   BMI 22.93 kg/m²     Urine: glucose negative, protein negative    Diagnoses and all orders for this visit:    1. 19 weeks gestation of pregnancy (Primary)  -     POC Urinalysis Dipstick    2. Primigravida, second trimester    3. Pregnancy resulting from in vitro fertilization in second trimester      RTC 4 weeks

## 2025-06-02 ENCOUNTER — PATIENT MESSAGE (OUTPATIENT)
Dept: OBSTETRICS AND GYNECOLOGY | Age: 35
End: 2025-06-02
Payer: COMMERCIAL

## 2025-06-02 DIAGNOSIS — O09.819 PREGNANCY RESULTING FROM IN VITRO FERTILIZATION, ANTEPARTUM: Primary | ICD-10-CM

## 2025-06-02 NOTE — TELEPHONE ENCOUNTER
Pt called and informed to come to office for Rocephin injection. Pt advised she may come to office any time prior to noon or after 1 this afternoon. Rigoberto SIU.

## 2025-06-07 ENCOUNTER — APPOINTMENT (OUTPATIENT)
Dept: ULTRASOUND IMAGING | Facility: HOSPITAL | Age: 35
End: 2025-06-07
Payer: COMMERCIAL

## 2025-06-07 ENCOUNTER — HOSPITAL ENCOUNTER (EMERGENCY)
Facility: HOSPITAL | Age: 35
Discharge: HOME | End: 2025-06-07
Attending: OBSTETRICS & GYNECOLOGY | Admitting: OBSTETRICS & GYNECOLOGY
Payer: COMMERCIAL

## 2025-06-07 VITALS
WEIGHT: 138 LBS | SYSTOLIC BLOOD PRESSURE: 117 MMHG | TEMPERATURE: 99 F | HEIGHT: 64 IN | DIASTOLIC BLOOD PRESSURE: 65 MMHG | OXYGEN SATURATION: 99 % | RESPIRATION RATE: 18 BRPM | BODY MASS INDEX: 23.56 KG/M2 | HEART RATE: 84 BPM

## 2025-06-07 PROBLEM — O26.892 PELVIC PAIN AFFECTING PREGNANCY IN SECOND TRIMESTER, ANTEPARTUM: Status: ACTIVE | Noted: 2025-06-07

## 2025-06-07 PROBLEM — R10.2 PELVIC PAIN AFFECTING PREGNANCY IN SECOND TRIMESTER, ANTEPARTUM: Status: ACTIVE | Noted: 2025-06-07

## 2025-06-07 PROBLEM — Z3A.21 21 WEEKS GESTATION OF PREGNANCY: Status: ACTIVE | Noted: 2025-06-07

## 2025-06-07 PROCEDURE — 76815 OB US LIMITED FETUS(S): CPT

## 2025-06-07 PROCEDURE — 99281 EMR DPT VST MAYX REQ PHY/QHP: CPT | Performed by: OBSTETRICS & GYNECOLOGY

## 2025-06-07 PROCEDURE — 76817 TRANSVAGINAL US OBSTETRIC: CPT

## 2025-06-07 NOTE — OBED NOTES
[facility]  Obstetric ED    Chief Complaint   Patient presents with    Abdominal Cramping       Subjective     Patient is a 34 y.o. female  currently at 21w2d, who presents to the [facility] with abdominal cramping.  Again last night with cramping which she would think is Cyril Lloyd although has never had them before.  Reports some coming and going lasting about a minute at a time.  This was also associated with back pain.  She was able to sleep but was occurring again this morning when she woke up.    History of endometriosis with adhesiolysis.  IVF pregnancy.  Hypothyroidism    The following portions of the patients history were reviewed and updated as appropriate: current medications, allergies, past medical history, past surgical history, past family history, past social history, and problem list .       Prenatal Information:  External Prenatal Results       Pregnancy Outside Results - Transcribed From Office Records - See Scanned Records For Details       Test Value Date Time    ABO  O  24 1159    Rh  Positive  24 1159    Antibody Screen       Varicella IgG       Rubella       Hgb  12.0 g/dL 25 0744    Hct  38.2 % 25 0744    HgB A1c        1h GTT       3h GTT Fasting       3h GTT 1 hour       3h GTT 2 hour       3h GTT 3 hour        Gonorrhea (discrete)       Chlamydia (discrete)       RPR       Syphils cascade: TP-Ab (FTA)       TP-Ab       TP-Ab (EIA)       TPPA       HBsAg       Herpes Simplex Virus PCR       Herpes Simplex VIrus Culture       HIV       Hep C RNA Quant PCR       Hep C Antibody       AFP       NIPT       Cystic Fibrosis (Yessi)       Cystic Fibroisis        Spinal Muscular atrophy       Fragile X       Group B Strep       GBS Susceptibility to Clindamycin       GBS Susceptibility to Erythromycin       Fetal Fibronectin       Genetic Testing, Maternal Blood                       Past OB History:     OB History    Para Term  AB Living   1 0 0 0 0 0    SAB IAB Ectopic Molar Multiple Live Births   0 0 0 0 0 0      # Outcome Date GA Lbr Declan/2nd Weight Sex Type Anes PTL Lv   1 Current                Past Medical History: Past Medical History:   Diagnosis Date    Anxiety     Depression     Family history of colonic polyps     Gastroesophageal reflux disease without esophagitis 08/19/2022    Endoscopy 1/2023 negative for Olvera's esophagus or mass.  No esophagitis.  Continue omeprazole 20 mg daily.      Hypothyroidism       Past Surgical History Past Surgical History:   Procedure Laterality Date    DIAGNOSTIC LAPAROSCOPY N/A 8/9/2024    Procedure: DIAGNOSTIC LAPAROSCOPY WITH DAVINCI ROBOT, resection of endometriosis, lysis of adhesions;  Surgeon: Srini Sandoval MD;  Location: [facility] OR;  Service: Robotics - DaVinci;  Laterality: N/A;    ENDOSCOPY N/A 01/18/2023    Normal esophagus; Normal stomach; Normal examined duodenum; No specimens collected      Family History: Family History   Problem Relation Age of Onset    Diabetes Mother     Hypertension Mother     Hyperlipidemia Mother     Olvera's esophagus Mother     Breast cancer Mother         BRCA testing pending as of 02/16/23    Drug abuse Brother     Olvera's esophagus Maternal Grandmother     Dementia Maternal Grandmother     Colon polyps Maternal Grandfather         < 60 years of age    Cancer Maternal Grandfather     Esophageal cancer Maternal Grandfather     Colon cancer Neg Hx     Liver cancer Neg Hx     Liver disease Neg Hx     Rectal cancer Neg Hx     Stomach cancer Neg Hx       Social History:  reports that she has never smoked. She has never been exposed to tobacco smoke. She has never used smokeless tobacco.   reports that she does not currently use alcohol.   reports no history of drug use.            Objective       Vital Signs Range for the last 24 hours  Temperature: Temp:  [99 °F (37.2 °C)] 99 °F (37.2 °C)   Temp Source: Temp src: Oral   BP: BP: (117)/(65) 117/65   Pulse: Heart Rate:  [84] 84    Respirations: Resp:  [18] 18   SPO2: SpO2:  [99 %] 99 %   Weight: Weight:  [62.6 kg (138 lb)] 62.6 kg (138 lb)     Physical Examination: General appearance - alert, well appearing, and in no distress        Fetal Heart Rate Assessment   Method: Fetal HR Assessment Method: intermittent auscultation, using Doppler   Beats/min: Fetal HR (beats/min): 159   Baseline:     Variability:     Accels:     Decels:     Tracing Category:       Uterine Assessment   Method: Method: external tocotransducer, palpation, per patient report   Frequency (min):     Ctx Count in 10 min:     Duration:     Intensity: Contraction Intensity: no contractions   Intensity by IUPC:     Resting Tone: Uterine Resting Tone: soft by palpation   Resting Tone by IUPC:     [place] Units:       Laboratory Results: No radiology results for the last day      Assessment & Plan       Pelvic pain affecting pregnancy in second trimester, antepartum    Pregnancy resulting from in vitro fertilization, antepartum    21 weeks gestation of pregnancy        Assessment:  1.  Patient is a 34 y.o.  at 21w2d, who presents with pelvic cramping that comes and goes since yesterday evening.  No contractions seen on toco.  Transvaginal cervical length was obtained.  Final read not available.  I personally viewed the images and cervix measured at 4.2 cm.  Prior report reviewed at which time it was 3.7.  Patient reassured that there is no evidence of  labor or cervical insufficiency at this time.  2.  Fetal heart rate 159  Plan:  1.  Discharge home.  Follow-up primary OB as scheduled.  2. Plan of care has been reviewed with patient   3.  All questions have been answered.      Moody Castillo,   2025  12:46 CDT

## 2025-06-13 ENCOUNTER — TELEPHONE (OUTPATIENT)
Dept: OBSTETRICS AND GYNECOLOGY | Age: 35
End: 2025-06-13
Payer: COMMERCIAL

## 2025-06-13 NOTE — TELEPHONE ENCOUNTER
Pt called with c/o increase in lgoan perry contractions. Pt was seen in [facility] on 6-7-25. Pt not dilated. Spoke with Dr Cavazos and pt to stay hydrated, to check if pt has constipation, or have pt come in to be swabbed for BV and yeast. Pt opted to be a little more aggressive with her constipation meds. Pt has been having trouble. Pt denied bleeding or leaking of fluid and reports good fetal movement.

## 2025-06-20 ENCOUNTER — ROUTINE PRENATAL (OUTPATIENT)
Dept: OBSTETRICS AND GYNECOLOGY | Age: 35
End: 2025-06-20
Payer: COMMERCIAL

## 2025-06-20 VITALS — BODY MASS INDEX: 23.82 KG/M2 | DIASTOLIC BLOOD PRESSURE: 68 MMHG | WEIGHT: 138.8 LBS | SYSTOLIC BLOOD PRESSURE: 114 MMHG

## 2025-06-20 DIAGNOSIS — O47.9 BRAXTON HICK'S CONTRACTION: ICD-10-CM

## 2025-06-20 DIAGNOSIS — O09.819 PREGNANCY RESULTING FROM IN VITRO FERTILIZATION, ANTEPARTUM: ICD-10-CM

## 2025-06-20 DIAGNOSIS — Z34.02 PRIMIGRAVIDA, SECOND TRIMESTER: ICD-10-CM

## 2025-06-20 DIAGNOSIS — Z3A.23 23 WEEKS GESTATION OF PREGNANCY: Primary | ICD-10-CM

## 2025-06-20 LAB
GLUCOSE UR STRIP-MCNC: NEGATIVE MG/DL
PROT UR STRIP-MCNC: NEGATIVE MG/DL

## 2025-06-20 PROCEDURE — 81513 NFCT DS BV RNA VAG FLU ALG: CPT | Performed by: OBSTETRICS & GYNECOLOGY

## 2025-06-20 PROCEDURE — 87481 CANDIDA DNA AMP PROBE: CPT | Performed by: OBSTETRICS & GYNECOLOGY

## 2025-06-20 PROCEDURE — 87661 TRICHOMONAS VAGINALIS AMPLIF: CPT | Performed by: OBSTETRICS & GYNECOLOGY

## 2025-06-20 PROCEDURE — 87563 M. GENITALIUM AMP PROBE: CPT | Performed by: OBSTETRICS & GYNECOLOGY

## 2025-06-20 PROCEDURE — 87591 N.GONORRHOEAE DNA AMP PROB: CPT | Performed by: OBSTETRICS & GYNECOLOGY

## 2025-06-20 PROCEDURE — 87491 CHLMYD TRACH DNA AMP PROBE: CPT | Performed by: OBSTETRICS & GYNECOLOGY

## 2025-06-20 NOTE — PROGRESS NOTES
Chief Complaint   Patient presents with    Routine Prenatal Visit     Pt here for obv and c/o consistent logan perry     [place] - more consistent than pain. When occurring, every 15 minutes. No pressure. Typically occurring daily. Seen in [facility] as well for this and cervical length reassuring along with exam. Endorses appropriate fetal movement. Denies regular contractions, leakage of fluid, vaginal bleeding or discharge.    Total weight gain: 4.899 kg (10 lb 12.8 oz)  Expected weight gain: 11.5 kg (25 lb)-16 kg (35 lb)  /68   Wt 63 kg (138 lb 12.8 oz)   LMP  (LMP Unknown)   BMI 23.82 kg/m²     Urine: glucose negative, protein negative    MA/RN chaperone present for exam.  Speculum: normal physiologic discharge, no pooling or bleeding; cervix thick and visibly closed    Diagnoses and all orders for this visit:    1. 23 weeks gestation of pregnancy (Primary)  -     POC Urinalysis Dipstick    2. Pregnancy resulting from in vitro fertilization, antepartum    3. Primigravida, second trimester    4. [place] Hick's contraction  -     Bacterial Vaginosis Panel Plus (PAD)  -     Urine Culture - Urine, Urine, Clean Catch      GTT next visit  2nd trimester care and expectations  When to report to OB ED discussed    labor precautions  Check urine and vaginitis panel   RTC 4 weeks

## 2025-06-22 LAB
BACTERIA UR CULT: NORMAL
BACTERIA UR CULT: NORMAL

## 2025-06-23 LAB
BACTERIAL VAGINITIS (PAD PANTHER): NEGATIVE
C TRACH RRNA SPEC QL NAA+PROBE: NOT DETECTED
CANDIDA GLABRATA (PAD PANTHER): NEGATIVE
CANDIDA SPECIES (PAD PANTHER): NEGATIVE
MYCOPLASMA GENITALIUM (PAD PANTHER): NEGATIVE
N GONORRHOEA RRNA SPEC QL NAA+PROBE: NOT DETECTED
TRICHOMONAS VAGINALIS (PAD PANTHER): NEGATIVE

## 2025-07-15 ENCOUNTER — ROUTINE PRENATAL (OUTPATIENT)
Dept: OBSTETRICS AND GYNECOLOGY | Age: 35
End: 2025-07-15
Payer: COMMERCIAL

## 2025-07-15 VITALS — BODY MASS INDEX: 24.89 KG/M2 | SYSTOLIC BLOOD PRESSURE: 108 MMHG | DIASTOLIC BLOOD PRESSURE: 68 MMHG | WEIGHT: 145 LBS

## 2025-07-15 DIAGNOSIS — O99.282 HYPOTHYROIDISM AFFECTING PREGNANCY IN SECOND TRIMESTER: ICD-10-CM

## 2025-07-15 DIAGNOSIS — Z13.1 SCREENING FOR DIABETES MELLITUS: ICD-10-CM

## 2025-07-15 DIAGNOSIS — Z3A.26 26 WEEKS GESTATION OF PREGNANCY: Primary | ICD-10-CM

## 2025-07-15 DIAGNOSIS — E03.9 HYPOTHYROIDISM AFFECTING PREGNANCY IN SECOND TRIMESTER: ICD-10-CM

## 2025-07-15 DIAGNOSIS — O09.819 PREGNANCY RESULTING FROM IN VITRO FERTILIZATION, ANTEPARTUM: ICD-10-CM

## 2025-07-15 DIAGNOSIS — Z34.02 PRIMIGRAVIDA, SECOND TRIMESTER: ICD-10-CM

## 2025-07-15 PROBLEM — O99.280 HYPOTHYROID IN PREGNANCY, ANTEPARTUM: Status: ACTIVE | Noted: 2017-07-19

## 2025-07-15 LAB
GLUCOSE UR STRIP-MCNC: NEGATIVE MG/DL
PROT UR STRIP-MCNC: NEGATIVE MG/DL

## 2025-07-15 NOTE — PROGRESS NOTES
Chief Complaint   Patient presents with    Routine Prenatal Visit     No complaints. Some braxtons still but nothing out of the norm. Some round ligament pains. Endorses appropriate fetal movement. Denies regular contractions, leakage of fluid, vaginal bleeding or discharge.    Total weight gain: 7.711 kg (17 lb)  Expected weight gain: 11.5 kg (25 lb)-16 kg (35 lb)  /68   Wt 65.8 kg (145 lb)   LMP  (LMP Unknown)   BMI 24.89 kg/m²     Urine: glucose negative, protein negative    Diagnoses and all orders for this visit:    1. 26 weeks gestation of pregnancy (Primary)  -     POC Urinalysis Dipstick  -     CBC (No Diff)  -     Gestational Screen 1 Hr ([facility])  -     Hepatitis B Surface Antigen  -     Hepatitis C Antibody  -     HIV-1 / O / 2 Ag / Antibody  -     Treponema pallidum AB w/Reflex RPR    2. Pregnancy resulting from in vitro fertilization, antepartum    3. Primigravida, second trimester    4. Screening for diabetes mellitus  -     Gestational Screen 1 Hr ([facility])    5. Hypothyroidism affecting pregnancy in second trimester  -     TSH Rfx On Abnormal To Free T4      GTT today  Fetal growth 32 weeks  Kick counts/Labor precautions  RTC 2 weeks

## 2025-07-16 LAB
ERYTHROCYTE [DISTWIDTH] IN BLOOD BY AUTOMATED COUNT: 12.8 % (ref 12.3–15.4)
GLUCOSE 1H P 50 G GLC PO SERPL-MCNC: 71 MG/DL (ref 65–139)
HBV SURFACE AG SERPL QL IA: NEGATIVE
HCT VFR BLD AUTO: 29.2 % (ref 34–46.6)
HCV IGG SERPL QL IA: NON REACTIVE
HGB BLD-MCNC: 9.7 G/DL (ref 12–15.9)
HIV 1+2 AB+HIV1 P24 AG SERPL QL IA: NON REACTIVE
IMP & REVIEW OF LAB RESULTS: NORMAL
MCH RBC QN AUTO: 29.9 PG (ref 26.6–33)
MCHC RBC AUTO-ENTMCNC: 33.2 G/DL (ref 31.5–35.7)
MCV RBC AUTO: 90.1 FL (ref 79–97)
PLATELET # BLD AUTO: 251 10*3/MM3 (ref 140–450)
RBC # BLD AUTO: 3.24 10*6/MM3 (ref 3.77–5.28)
TREPONEMA PALLIDUM IGG+IGM AB [PRESENCE] IN SERUM OR PLASMA BY IMMUNOASSAY: NON REACTIVE
TSH SERPL DL<=0.005 MIU/L-ACNC: 1.82 UIU/ML (ref 0.27–4.2)
WBC # BLD AUTO: 9.11 10*3/MM3 (ref 3.4–10.8)

## 2025-07-29 ENCOUNTER — ROUTINE PRENATAL (OUTPATIENT)
Dept: OBSTETRICS AND GYNECOLOGY | Age: 35
End: 2025-07-29
Payer: COMMERCIAL

## 2025-07-29 VITALS — WEIGHT: 144 LBS | BODY MASS INDEX: 24.72 KG/M2 | DIASTOLIC BLOOD PRESSURE: 72 MMHG | SYSTOLIC BLOOD PRESSURE: 108 MMHG

## 2025-07-29 DIAGNOSIS — Z34.03 PRIMIGRAVIDA IN THIRD TRIMESTER: ICD-10-CM

## 2025-07-29 DIAGNOSIS — O99.613 CONSTIPATION DURING PREGNANCY IN THIRD TRIMESTER: ICD-10-CM

## 2025-07-29 DIAGNOSIS — K59.00 CONSTIPATION DURING PREGNANCY IN THIRD TRIMESTER: ICD-10-CM

## 2025-07-29 DIAGNOSIS — Z3A.28 28 WEEKS GESTATION OF PREGNANCY: Primary | ICD-10-CM

## 2025-07-29 DIAGNOSIS — O99.019 MATERNAL ANEMIA IN PREGNANCY, ANTEPARTUM: ICD-10-CM

## 2025-07-29 DIAGNOSIS — Z71.85 IMMUNIZATION COUNSELING: ICD-10-CM

## 2025-07-29 DIAGNOSIS — O09.819 PREGNANCY RESULTING FROM IN VITRO FERTILIZATION, ANTEPARTUM: ICD-10-CM

## 2025-07-29 LAB
GLUCOSE UR STRIP-MCNC: NEGATIVE MG/DL
PROT UR STRIP-MCNC: NEGATIVE MG/DL

## 2025-07-29 NOTE — PROGRESS NOTES
Chief Complaint   Patient presents with    Routine Prenatal Visit     No complaints. Some constipation since starting iron but working through it with milk of mag, miralax and colace. Endorses appropriate fetal movement. Denies regular contractions, leakage of fluid, vaginal bleeding or discharge.    Total weight gain: 7.258 kg (16 lb)  Expected weight gain: 11.5 kg (25 lb)-16 kg (35 lb)  /72   Wt 65.3 kg (144 lb)   LMP  (LMP Unknown)   BMI 24.72 kg/m²     Urine: glucose negative, protein negative    Diagnoses and all orders for this visit:    1. 28 weeks gestation of pregnancy (Primary)  -     POC Urinalysis Dipstick    2. Pregnancy resulting from in vitro fertilization, antepartum    3. Maternal anemia in pregnancy, antepartum    4. Primigravida in third trimester    5. Constipation during pregnancy in third trimester    6. Immunization counseling  -     Tdap Vaccine => 6yo IM (BOOSTRIX/ADACEL)      Passed GTT  Continue Iron supplementation  Constipation - reviewed, will consider changing iron supplement next visit if still problematic   Kick counts/Labor precautions  RTC 2 weeks

## 2025-08-12 ENCOUNTER — ROUTINE PRENATAL (OUTPATIENT)
Dept: OBSTETRICS AND GYNECOLOGY | Age: 35
End: 2025-08-12
Payer: COMMERCIAL

## 2025-08-12 VITALS — WEIGHT: 149 LBS | BODY MASS INDEX: 25.58 KG/M2 | DIASTOLIC BLOOD PRESSURE: 74 MMHG | SYSTOLIC BLOOD PRESSURE: 116 MMHG

## 2025-08-12 DIAGNOSIS — Z3A.30 30 WEEKS GESTATION OF PREGNANCY: Primary | ICD-10-CM

## 2025-08-12 DIAGNOSIS — Z34.03 PRIMIGRAVIDA IN THIRD TRIMESTER: ICD-10-CM

## 2025-08-12 DIAGNOSIS — O99.019 MATERNAL ANEMIA IN PREGNANCY, ANTEPARTUM: ICD-10-CM

## 2025-08-12 DIAGNOSIS — O09.819 PREGNANCY RESULTING FROM IN VITRO FERTILIZATION, ANTEPARTUM: ICD-10-CM

## 2025-08-12 LAB
GLUCOSE UR STRIP-MCNC: NEGATIVE MG/DL
PROT UR STRIP-MCNC: NEGATIVE MG/DL

## 2025-08-26 ENCOUNTER — ROUTINE PRENATAL (OUTPATIENT)
Dept: OBSTETRICS AND GYNECOLOGY | Age: 35
End: 2025-08-26
Payer: COMMERCIAL

## 2025-08-26 VITALS — SYSTOLIC BLOOD PRESSURE: 122 MMHG | WEIGHT: 156 LBS | DIASTOLIC BLOOD PRESSURE: 84 MMHG | BODY MASS INDEX: 26.78 KG/M2

## 2025-08-26 DIAGNOSIS — O09.819 PREGNANCY RESULTING FROM IN VITRO FERTILIZATION, ANTEPARTUM: ICD-10-CM

## 2025-08-26 DIAGNOSIS — Z3A.32 32 WEEKS GESTATION OF PREGNANCY: Primary | ICD-10-CM

## 2025-08-26 DIAGNOSIS — O99.019 MATERNAL ANEMIA IN PREGNANCY, ANTEPARTUM: ICD-10-CM

## 2025-08-26 DIAGNOSIS — O12.03 EDEMA IN PREGNANCY IN THIRD TRIMESTER: ICD-10-CM

## 2025-08-26 DIAGNOSIS — H53.9 VISION CHANGES: ICD-10-CM

## 2025-08-26 LAB
GLUCOSE UR STRIP-MCNC: NEGATIVE MG/DL
PROT UR STRIP-MCNC: NEGATIVE MG/DL

## 2025-08-27 LAB
ALBUMIN SERPL-MCNC: 3.4 G/DL (ref 3.5–5.2)
ALBUMIN/GLOB SERPL: 1.4 G/DL
ALP SERPL-CCNC: 78 U/L (ref 39–117)
ALT SERPL-CCNC: 26 U/L (ref 1–33)
AST SERPL-CCNC: 23 U/L (ref 1–32)
BILIRUB SERPL-MCNC: <0.2 MG/DL (ref 0–1.2)
BUN SERPL-MCNC: 8 MG/DL (ref 6–20)
BUN/CREAT SERPL: 10.7 (ref 7–25)
CALCIUM SERPL-MCNC: 8.8 MG/DL (ref 8.6–10.5)
CHLORIDE SERPL-SCNC: 104 MMOL/L (ref 98–107)
CO2 SERPL-SCNC: 22.4 MMOL/L (ref 22–29)
CREAT SERPL-MCNC: 0.75 MG/DL (ref 0.57–1)
EGFRCR SERPLBLD CKD-EPI 2021: 108.6 ML/MIN/1.73
ERYTHROCYTE [DISTWIDTH] IN BLOOD BY AUTOMATED COUNT: 12.6 % (ref 12.3–15.4)
FOLATE SERPL-MCNC: 18.6 NG/ML (ref 4.78–24.2)
GLOBULIN SER CALC-MCNC: 2.5 GM/DL
GLUCOSE SERPL-MCNC: 86 MG/DL (ref 65–99)
HCT VFR BLD AUTO: 30.9 % (ref 34–46.6)
HGB BLD-MCNC: 10.3 G/DL (ref 12–15.9)
IRON SATN MFR SERPL: 13 % (ref 20–50)
IRON SERPL-MCNC: 70 MCG/DL (ref 37–145)
LDH SERPL L TO P-CCNC: 180 U/L (ref 135–214)
MCH RBC QN AUTO: 30.7 PG (ref 26.6–33)
MCHC RBC AUTO-ENTMCNC: 33.3 G/DL (ref 31.5–35.7)
MCV RBC AUTO: 92 FL (ref 79–97)
PLATELET # BLD AUTO: 240 10*3/MM3 (ref 140–450)
POTASSIUM SERPL-SCNC: 4.1 MMOL/L (ref 3.5–5.2)
PROT SERPL-MCNC: 5.9 G/DL (ref 6–8.5)
RBC # BLD AUTO: 3.36 10*6/MM3 (ref 3.77–5.28)
SODIUM SERPL-SCNC: 138 MMOL/L (ref 136–145)
TIBC SERPL-MCNC: 539 MCG/DL
UIBC SERPL-MCNC: 469 MCG/DL (ref 112–346)
URATE SERPL-MCNC: 5.2 MG/DL (ref 2.4–5.7)
VIT B12 SERPL-MCNC: 404 PG/ML (ref 211–946)
WBC # BLD AUTO: 8.89 10*3/MM3 (ref 3.4–10.8)

## (undated) DEVICE — SEAL

## (undated) DEVICE — APPL HEMO ENDO SURGICEL 2IN1 1P/U STRL

## (undated) DEVICE — TIP COVER ACCESSORY

## (undated) DEVICE — TROC BLADLES ANCHORPORT/OPTI LP 8X120MM 1P/U

## (undated) DEVICE — BLADELESS OBTURATOR: Brand: WECK VISTA

## (undated) DEVICE — PATIENT RETURN ELECTRODE, SINGLE-USE, CONTACT QUALITY MONITORING, ADULT, WITH 9FT CORD, FOR PATIENTS WEIGING OVER 33LBS. (15KG): Brand: MEGADYNE

## (undated) DEVICE — ST TBG AIRSEAL FLTR TRI LUM

## (undated) DEVICE — YANKAUER,BULB TIP WITH VENT: Brand: ARGYLE

## (undated) DEVICE — TOTAL TRAY, 16FR 10ML SIL FOLEY, URN: Brand: MEDLINE

## (undated) DEVICE — ARM DRAPE

## (undated) DEVICE — SENSR O2 OXIMAX FNGR A/ 18IN NONSTR

## (undated) DEVICE — HDRST INTUB GENTLETOUCH SLOT 7IN RT

## (undated) DEVICE — PK POSTN TRENGUARD450 PROC

## (undated) DEVICE — KT CLN CLEANOR SCPE

## (undated) DEVICE — Device: Brand: DEFENDO AIR/WATER/SUCTION AND BIOPSY VALVE

## (undated) DEVICE — CUFF,BP,DISP,1 TUBE,ADULT,HP: Brand: MEDLINE

## (undated) DEVICE — DAVINCI: Brand: MEDLINE INDUSTRIES, INC.

## (undated) DEVICE — ANTIBACTERIAL UNDYED BRAIDED (POLYGLACTIN 910), SYNTHETIC ABSORBABLE SUTURE: Brand: COATED VICRYL

## (undated) DEVICE — GLV SURG SENSICARE W/ALOE PF LF 7.5 STRL

## (undated) DEVICE — THE CHANNEL CLEANING BRUSH IS A NYLON FLEXI BRUSH ATTACHED TO A FLEXIBLE PLASTIC SHEATH DESIGNED TO SAFELY REMOVE DEBRIS FROM FLEXIBLE ENDOSCOPES.

## (undated) DEVICE — CONMED SCOPE SAVER BITE BLOCK, 20X27 MM: Brand: SCOPE SAVER